# Patient Record
Sex: FEMALE | Race: WHITE | Employment: OTHER | URBAN - METROPOLITAN AREA
[De-identification: names, ages, dates, MRNs, and addresses within clinical notes are randomized per-mention and may not be internally consistent; named-entity substitution may affect disease eponyms.]

---

## 2019-07-11 ENCOUNTER — HOSPITAL ENCOUNTER (OUTPATIENT)
Age: 74
Discharge: HOME | End: 2019-07-11
Payer: MEDICARE

## 2019-07-11 LAB
BACTERIA: ABNORMAL /HPF
BILIRUBIN URINE: NEGATIVE
BLOOD, URINE: ABNORMAL
CLARITY: CLEAR
COLOR: YELLOW
GLUCOSE URINE: NEGATIVE MG/DL
KETONES, URINE: ABNORMAL MG/DL
LEUKOCYTE ESTERASE, URINE: ABNORMAL
NITRITE, URINE: NEGATIVE
PH UA: 6 (ref 5–9)
PROTEIN UA: 100 MG/DL
RBC UA: ABNORMAL /HPF (ref 0–2)
SPECIFIC GRAVITY UA: 1.02 (ref 1–1.03)
UROBILINOGEN, URINE: 0.2 E.U./DL
WBC UA: ABNORMAL /HPF (ref 0–5)

## 2019-07-11 PROCEDURE — 87186 SC STD MICRODIL/AGAR DIL: CPT

## 2019-07-11 PROCEDURE — 87088 URINE BACTERIA CULTURE: CPT

## 2019-07-11 PROCEDURE — 87077 CULTURE AEROBIC IDENTIFY: CPT

## 2019-07-11 PROCEDURE — 81001 URINALYSIS AUTO W/SCOPE: CPT

## 2019-07-14 LAB
ORGANISM: ABNORMAL
URINE CULTURE, ROUTINE: ABNORMAL
URINE CULTURE, ROUTINE: ABNORMAL

## 2019-11-19 ENCOUNTER — HOSPITAL ENCOUNTER (OUTPATIENT)
Age: 74
Discharge: HOME | End: 2019-11-21

## 2019-11-19 PROCEDURE — 88112 CYTOPATH CELL ENHANCE TECH: CPT

## 2020-01-01 ENCOUNTER — HOSPITAL ENCOUNTER (OUTPATIENT)
Age: 75
Discharge: HOME | End: 2020-12-09
Payer: MEDICARE

## 2020-01-01 ENCOUNTER — ANESTHESIA EVENT (OUTPATIENT)
Dept: OPERATING ROOM | Age: 75
End: 2020-01-01
Payer: MEDICARE

## 2020-01-01 ENCOUNTER — HOSPITAL ENCOUNTER (OUTPATIENT)
Age: 75
Discharge: HOME | End: 2020-10-31
Payer: MEDICARE

## 2020-01-01 ENCOUNTER — HOSPITAL ENCOUNTER (OUTPATIENT)
Dept: ULTRASOUND IMAGING | Age: 75
Discharge: HOME | End: 2020-12-09
Payer: MEDICARE

## 2020-01-01 ENCOUNTER — HOSPITAL ENCOUNTER (EMERGENCY)
Age: 75
Discharge: HOME | End: 2020-09-08
Attending: EMERGENCY MEDICINE
Payer: MEDICARE

## 2020-01-01 ENCOUNTER — HOSPITAL ENCOUNTER (OUTPATIENT)
Age: 75
Setting detail: OUTPATIENT SURGERY
Discharge: HOME | End: 2020-11-04
Attending: INTERNAL MEDICINE | Admitting: INTERNAL MEDICINE
Payer: MEDICARE

## 2020-01-01 ENCOUNTER — APPOINTMENT (OUTPATIENT)
Dept: GENERAL RADIOLOGY | Age: 75
End: 2020-01-01
Attending: INTERNAL MEDICINE
Payer: MEDICARE

## 2020-01-01 ENCOUNTER — ANESTHESIA (OUTPATIENT)
Dept: OPERATING ROOM | Age: 75
End: 2020-01-01
Payer: MEDICARE

## 2020-01-01 ENCOUNTER — HOSPITAL ENCOUNTER (OUTPATIENT)
Age: 75
Discharge: HOME | End: 2020-11-02
Payer: MEDICARE

## 2020-01-01 VITALS
OXYGEN SATURATION: 93 % | RESPIRATION RATE: 24 BRPM | SYSTOLIC BLOOD PRESSURE: 113 MMHG | DIASTOLIC BLOOD PRESSURE: 70 MMHG

## 2020-01-01 VITALS
OXYGEN SATURATION: 94 % | BODY MASS INDEX: 25.1 KG/M2 | HEIGHT: 64 IN | RESPIRATION RATE: 16 BRPM | SYSTOLIC BLOOD PRESSURE: 111 MMHG | HEART RATE: 67 BPM | TEMPERATURE: 97.4 F | WEIGHT: 147 LBS | DIASTOLIC BLOOD PRESSURE: 67 MMHG

## 2020-01-01 VITALS
HEIGHT: 64 IN | TEMPERATURE: 97.3 F | DIASTOLIC BLOOD PRESSURE: 68 MMHG | WEIGHT: 155 LBS | OXYGEN SATURATION: 98 % | RESPIRATION RATE: 16 BRPM | SYSTOLIC BLOOD PRESSURE: 120 MMHG | BODY MASS INDEX: 26.46 KG/M2 | HEART RATE: 90 BPM

## 2020-01-01 LAB
AFP-TUMOR MARKER: 12 NG/ML (ref 0–9)
ALBUMIN SERPL-MCNC: 3.4 G/DL (ref 3.5–5.2)
ALBUMIN SERPL-MCNC: 3.7 G/DL (ref 3.5–5.2)
ALP BLD-CCNC: 106 U/L (ref 35–104)
ALP BLD-CCNC: 117 U/L (ref 35–104)
ALT SERPL-CCNC: 10 U/L (ref 0–32)
ALT SERPL-CCNC: 11 U/L (ref 0–32)
ANION GAP SERPL CALCULATED.3IONS-SCNC: 10 MMOL/L (ref 7–16)
ANION GAP SERPL CALCULATED.3IONS-SCNC: 9 MMOL/L (ref 7–16)
APTT: 33.3 SEC (ref 24.5–35.1)
AST SERPL-CCNC: 23 U/L (ref 0–31)
AST SERPL-CCNC: 23 U/L (ref 0–31)
BASOPHILS ABSOLUTE: 0.05 E9/L (ref 0–0.2)
BASOPHILS RELATIVE PERCENT: 1 % (ref 0–2)
BILIRUB SERPL-MCNC: 0.7 MG/DL (ref 0–1.2)
BILIRUB SERPL-MCNC: 1 MG/DL (ref 0–1.2)
BUN BLDV-MCNC: 19 MG/DL (ref 8–23)
BUN BLDV-MCNC: 23 MG/DL (ref 8–23)
CALCIUM SERPL-MCNC: 10 MG/DL (ref 8.6–10.2)
CALCIUM SERPL-MCNC: 9.1 MG/DL (ref 8.6–10.2)
CHLORIDE BLD-SCNC: 104 MMOL/L (ref 98–107)
CHLORIDE BLD-SCNC: 104 MMOL/L (ref 98–107)
CO2: 26 MMOL/L (ref 22–29)
CO2: 28 MMOL/L (ref 22–29)
CREAT SERPL-MCNC: 1.1 MG/DL (ref 0.5–1)
CREAT SERPL-MCNC: 1.3 MG/DL (ref 0.5–1)
EKG ATRIAL RATE: 70 BPM
EKG P AXIS: 28 DEGREES
EKG P-R INTERVAL: 150 MS
EKG Q-T INTERVAL: 436 MS
EKG QRS DURATION: 78 MS
EKG QTC CALCULATION (BAZETT): 470 MS
EKG R AXIS: -35 DEGREES
EKG T AXIS: 19 DEGREES
EKG VENTRICULAR RATE: 70 BPM
EOSINOPHILS ABSOLUTE: 0.21 E9/L (ref 0.05–0.5)
EOSINOPHILS RELATIVE PERCENT: 4.4 % (ref 0–6)
GFR AFRICAN AMERICAN: 48
GFR AFRICAN AMERICAN: 59
GFR NON-AFRICAN AMERICAN: 40 ML/MIN/1.73
GFR NON-AFRICAN AMERICAN: 48 ML/MIN/1.73
GLUCOSE BLD-MCNC: 114 MG/DL (ref 74–99)
GLUCOSE BLD-MCNC: 142 MG/DL (ref 74–99)
HCT VFR BLD CALC: 30.6 % (ref 34–48)
HCT VFR BLD CALC: 32.1 % (ref 34–48)
HEMOGLOBIN: 10.2 G/DL (ref 11.5–15.5)
HEMOGLOBIN: 9.8 G/DL (ref 11.5–15.5)
IMMATURE GRANULOCYTES #: 0.02 E9/L
IMMATURE GRANULOCYTES %: 0.4 % (ref 0–5)
INR BLD: 1.2
LYMPHOCYTES ABSOLUTE: 0.72 E9/L (ref 1.5–4)
LYMPHOCYTES RELATIVE PERCENT: 15 % (ref 20–42)
MCH RBC QN AUTO: 31.6 PG (ref 26–35)
MCH RBC QN AUTO: 31.8 PG (ref 26–35)
MCHC RBC AUTO-ENTMCNC: 31.8 % (ref 32–34.5)
MCHC RBC AUTO-ENTMCNC: 32 % (ref 32–34.5)
MCV RBC AUTO: 100 FL (ref 80–99.9)
MCV RBC AUTO: 98.7 FL (ref 80–99.9)
MONOCYTES ABSOLUTE: 0.49 E9/L (ref 0.1–0.95)
MONOCYTES RELATIVE PERCENT: 10.2 % (ref 2–12)
NEUTROPHILS ABSOLUTE: 3.31 E9/L (ref 1.8–7.3)
NEUTROPHILS RELATIVE PERCENT: 69 % (ref 43–80)
PDW BLD-RTO: 14.5 FL (ref 11.5–15)
PDW BLD-RTO: 15.6 FL (ref 11.5–15)
PLATELET # BLD: 150 E9/L (ref 130–450)
PLATELET # BLD: 169 E9/L (ref 130–450)
PMV BLD AUTO: 9.8 FL (ref 7–12)
PMV BLD AUTO: 9.9 FL (ref 7–12)
POTASSIUM REFLEX MAGNESIUM: 3.7 MMOL/L (ref 3.5–5)
POTASSIUM SERPL-SCNC: 4 MMOL/L (ref 3.5–5)
PROTHROMBIN TIME: 14 SEC (ref 9.3–12.4)
RBC # BLD: 3.1 E12/L (ref 3.5–5.5)
RBC # BLD: 3.21 E12/L (ref 3.5–5.5)
SARS-COV-2: NOT DETECTED
SODIUM BLD-SCNC: 140 MMOL/L (ref 132–146)
SODIUM BLD-SCNC: 141 MMOL/L (ref 132–146)
SOURCE: NORMAL
TOTAL PROTEIN: 7.5 G/DL (ref 6.4–8.3)
TOTAL PROTEIN: 8.3 G/DL (ref 6.4–8.3)
WBC # BLD: 3.8 E9/L (ref 4.5–11.5)
WBC # BLD: 4.8 E9/L (ref 4.5–11.5)

## 2020-01-01 PROCEDURE — 2500000003 HC RX 250 WO HCPCS: Performed by: NURSE ANESTHETIST, CERTIFIED REGISTERED

## 2020-01-01 PROCEDURE — 82105 ALPHA-FETOPROTEIN SERUM: CPT

## 2020-01-01 PROCEDURE — 2709999900 HC NON-CHARGEABLE SUPPLY: Performed by: INTERNAL MEDICINE

## 2020-01-01 PROCEDURE — 7100000001 HC PACU RECOVERY - ADDTL 15 MIN: Performed by: INTERNAL MEDICINE

## 2020-01-01 PROCEDURE — 36415 COLL VENOUS BLD VENIPUNCTURE: CPT

## 2020-01-01 PROCEDURE — 7100000010 HC PHASE II RECOVERY - FIRST 15 MIN: Performed by: INTERNAL MEDICINE

## 2020-01-01 PROCEDURE — 3700000001 HC ADD 15 MINUTES (ANESTHESIA): Performed by: INTERNAL MEDICINE

## 2020-01-01 PROCEDURE — P9047 ALBUMIN (HUMAN), 25%, 50ML: HCPCS | Performed by: INTERNAL MEDICINE

## 2020-01-01 PROCEDURE — 93005 ELECTROCARDIOGRAM TRACING: CPT | Performed by: ANESTHESIOLOGY

## 2020-01-01 PROCEDURE — 6360000002 HC RX W HCPCS: Performed by: NURSE ANESTHETIST, CERTIFIED REGISTERED

## 2020-01-01 PROCEDURE — 85027 COMPLETE CBC AUTOMATED: CPT

## 2020-01-01 PROCEDURE — 6360000002 HC RX W HCPCS: Performed by: INTERNAL MEDICINE

## 2020-01-01 PROCEDURE — 85610 PROTHROMBIN TIME: CPT

## 2020-01-01 PROCEDURE — 49083 ABD PARACENTESIS W/IMAGING: CPT

## 2020-01-01 PROCEDURE — 85730 THROMBOPLASTIN TIME PARTIAL: CPT

## 2020-01-01 PROCEDURE — U0003 INFECTIOUS AGENT DETECTION BY NUCLEIC ACID (DNA OR RNA); SEVERE ACUTE RESPIRATORY SYNDROME CORONAVIRUS 2 (SARS-COV-2) (CORONAVIRUS DISEASE [COVID-19]), AMPLIFIED PROBE TECHNIQUE, MAKING USE OF HIGH THROUGHPUT TECHNOLOGIES AS DESCRIBED BY CMS-2020-01-R: HCPCS

## 2020-01-01 PROCEDURE — 99282 EMERGENCY DEPT VISIT SF MDM: CPT

## 2020-01-01 PROCEDURE — 3600007503: Performed by: INTERNAL MEDICINE

## 2020-01-01 PROCEDURE — 80053 COMPREHEN METABOLIC PANEL: CPT

## 2020-01-01 PROCEDURE — 85025 COMPLETE CBC W/AUTO DIFF WBC: CPT

## 2020-01-01 PROCEDURE — 7100000000 HC PACU RECOVERY - FIRST 15 MIN: Performed by: INTERNAL MEDICINE

## 2020-01-01 PROCEDURE — 2720000010 HC SURG SUPPLY STERILE: Performed by: INTERNAL MEDICINE

## 2020-01-01 PROCEDURE — 3700000000 HC ANESTHESIA ATTENDED CARE: Performed by: INTERNAL MEDICINE

## 2020-01-01 PROCEDURE — 2580000003 HC RX 258: Performed by: ANESTHESIOLOGY

## 2020-01-01 PROCEDURE — 7100000011 HC PHASE II RECOVERY - ADDTL 15 MIN: Performed by: INTERNAL MEDICINE

## 2020-01-01 PROCEDURE — 3600007513: Performed by: INTERNAL MEDICINE

## 2020-01-01 PROCEDURE — 3209999900 FLUORO FOR SURGICAL PROCEDURES

## 2020-01-01 RX ORDER — ALBUMIN (HUMAN) 12.5 G/50ML
25 SOLUTION INTRAVENOUS ONCE
Status: COMPLETED | OUTPATIENT
Start: 2020-01-01 | End: 2020-01-01

## 2020-01-01 RX ORDER — SODIUM CHLORIDE 0.9 % (FLUSH) 0.9 %
10 SYRINGE (ML) INJECTION PRN
Status: DISCONTINUED | OUTPATIENT
Start: 2020-01-01 | End: 2020-01-01 | Stop reason: SDUPTHER

## 2020-01-01 RX ORDER — SODIUM CHLORIDE 9 MG/ML
INJECTION, SOLUTION INTRAVENOUS CONTINUOUS
Status: DISCONTINUED | OUTPATIENT
Start: 2020-01-01 | End: 2020-01-01 | Stop reason: HOSPADM

## 2020-01-01 RX ORDER — ONDANSETRON 2 MG/ML
INJECTION INTRAMUSCULAR; INTRAVENOUS PRN
Status: DISCONTINUED | OUTPATIENT
Start: 2020-01-01 | End: 2020-01-01 | Stop reason: SDUPTHER

## 2020-01-01 RX ORDER — MULTIVITAMIN WITH IRON
250 TABLET ORAL DAILY
COMMUNITY

## 2020-01-01 RX ORDER — FENTANYL CITRATE 50 UG/ML
INJECTION, SOLUTION INTRAMUSCULAR; INTRAVENOUS PRN
Status: DISCONTINUED | OUTPATIENT
Start: 2020-01-01 | End: 2020-01-01 | Stop reason: SDUPTHER

## 2020-01-01 RX ORDER — SODIUM CHLORIDE 0.9 % (FLUSH) 0.9 %
10 SYRINGE (ML) INJECTION EVERY 12 HOURS SCHEDULED
Status: DISCONTINUED | OUTPATIENT
Start: 2020-01-01 | End: 2020-01-01 | Stop reason: SDUPTHER

## 2020-01-01 RX ORDER — SODIUM CHLORIDE 0.9 % (FLUSH) 0.9 %
10 SYRINGE (ML) INJECTION PRN
Status: DISCONTINUED | OUTPATIENT
Start: 2020-01-01 | End: 2020-01-01 | Stop reason: HOSPADM

## 2020-01-01 RX ORDER — MEPERIDINE HYDROCHLORIDE 25 MG/ML
12.5 INJECTION INTRAMUSCULAR; INTRAVENOUS; SUBCUTANEOUS EVERY 5 MIN PRN
Status: DISCONTINUED | OUTPATIENT
Start: 2020-01-01 | End: 2020-01-01 | Stop reason: HOSPADM

## 2020-01-01 RX ORDER — GLYCOPYRROLATE 1 MG/5 ML
SYRINGE (ML) INTRAVENOUS PRN
Status: DISCONTINUED | OUTPATIENT
Start: 2020-01-01 | End: 2020-01-01 | Stop reason: SDUPTHER

## 2020-01-01 RX ORDER — EPHEDRINE SULFATE/0.9% NACL/PF 50 MG/5 ML
SYRINGE (ML) INTRAVENOUS PRN
Status: DISCONTINUED | OUTPATIENT
Start: 2020-01-01 | End: 2020-01-01 | Stop reason: SDUPTHER

## 2020-01-01 RX ORDER — PANTOPRAZOLE SODIUM 40 MG/1
40 TABLET, DELAYED RELEASE ORAL DAILY
COMMUNITY

## 2020-01-01 RX ORDER — ROCURONIUM BROMIDE 10 MG/ML
INJECTION, SOLUTION INTRAVENOUS PRN
Status: DISCONTINUED | OUTPATIENT
Start: 2020-01-01 | End: 2020-01-01 | Stop reason: SDUPTHER

## 2020-01-01 RX ORDER — ESCITALOPRAM OXALATE 10 MG/1
10 TABLET ORAL DAILY
COMMUNITY

## 2020-01-01 RX ORDER — NEOSTIGMINE METHYLSULFATE 1 MG/ML
INJECTION, SOLUTION INTRAVENOUS PRN
Status: DISCONTINUED | OUTPATIENT
Start: 2020-01-01 | End: 2020-01-01 | Stop reason: SDUPTHER

## 2020-01-01 RX ORDER — MIDAZOLAM HYDROCHLORIDE 1 MG/ML
INJECTION INTRAMUSCULAR; INTRAVENOUS PRN
Status: DISCONTINUED | OUTPATIENT
Start: 2020-01-01 | End: 2020-01-01 | Stop reason: SDUPTHER

## 2020-01-01 RX ORDER — SODIUM CHLORIDE 0.9 % (FLUSH) 0.9 %
10 SYRINGE (ML) INJECTION EVERY 12 HOURS SCHEDULED
Status: DISCONTINUED | OUTPATIENT
Start: 2020-01-01 | End: 2020-01-01 | Stop reason: HOSPADM

## 2020-01-01 RX ORDER — DEXAMETHASONE SODIUM PHOSPHATE 4 MG/ML
INJECTION, SOLUTION INTRA-ARTICULAR; INTRALESIONAL; INTRAMUSCULAR; INTRAVENOUS; SOFT TISSUE PRN
Status: DISCONTINUED | OUTPATIENT
Start: 2020-01-01 | End: 2020-01-01 | Stop reason: SDUPTHER

## 2020-01-01 RX ORDER — PROPOFOL 10 MG/ML
INJECTION, EMULSION INTRAVENOUS PRN
Status: DISCONTINUED | OUTPATIENT
Start: 2020-01-01 | End: 2020-01-01 | Stop reason: SDUPTHER

## 2020-01-01 RX ORDER — LIDOCAINE HYDROCHLORIDE 20 MG/ML
INJECTION, SOLUTION INTRAVENOUS PRN
Status: DISCONTINUED | OUTPATIENT
Start: 2020-01-01 | End: 2020-01-01 | Stop reason: SDUPTHER

## 2020-01-01 RX ADMIN — LIDOCAINE HYDROCHLORIDE 100 MG: 20 INJECTION, SOLUTION INTRAVENOUS at 08:34

## 2020-01-01 RX ADMIN — ONDANSETRON 4 MG: 2 INJECTION INTRAMUSCULAR; INTRAVENOUS at 09:31

## 2020-01-01 RX ADMIN — GLUCAGON HYDROCHLORIDE 0.5 MG: KIT at 09:16

## 2020-01-01 RX ADMIN — Medication 10 MG: at 08:44

## 2020-01-01 RX ADMIN — SODIUM CHLORIDE: 9 INJECTION, SOLUTION INTRAVENOUS at 08:29

## 2020-01-01 RX ADMIN — DEXAMETHASONE SODIUM PHOSPHATE 4 MG: 4 INJECTION, SOLUTION INTRAMUSCULAR; INTRAVENOUS at 08:34

## 2020-01-01 RX ADMIN — SODIUM CHLORIDE: 9 INJECTION, SOLUTION INTRAVENOUS at 09:45

## 2020-01-01 RX ADMIN — MIDAZOLAM 2 MG: 1 INJECTION INTRAMUSCULAR; INTRAVENOUS at 08:29

## 2020-01-01 RX ADMIN — Medication 3 MG: at 09:25

## 2020-01-01 RX ADMIN — Medication 10 MG: at 08:46

## 2020-01-01 RX ADMIN — ROCURONIUM BROMIDE 30 MG: 10 SOLUTION INTRAVENOUS at 08:34

## 2020-01-01 RX ADMIN — DEXAMETHASONE SODIUM PHOSPHATE 4 MG: 4 INJECTION, SOLUTION INTRAMUSCULAR; INTRAVENOUS at 09:31

## 2020-01-01 RX ADMIN — PROPOFOL 150 MG: 10 INJECTION, EMULSION INTRAVENOUS at 08:34

## 2020-01-01 RX ADMIN — ALBUMIN (HUMAN) 25 G: 0.25 INJECTION, SOLUTION INTRAVENOUS at 15:06

## 2020-01-01 RX ADMIN — Medication 0.6 MG: at 09:25

## 2020-01-01 RX ADMIN — FENTANYL CITRATE 50 MCG: 50 INJECTION, SOLUTION INTRAMUSCULAR; INTRAVENOUS at 08:34

## 2020-01-01 ASSESSMENT — PULMONARY FUNCTION TESTS
PIF_VALUE: 19
PIF_VALUE: 17
PIF_VALUE: 16
PIF_VALUE: 30
PIF_VALUE: 2
PIF_VALUE: 0
PIF_VALUE: 19
PIF_VALUE: 3
PIF_VALUE: 22
PIF_VALUE: 17
PIF_VALUE: 16
PIF_VALUE: 22
PIF_VALUE: 4
PIF_VALUE: 18
PIF_VALUE: 19
PIF_VALUE: 17
PIF_VALUE: 16
PIF_VALUE: 20
PIF_VALUE: 38
PIF_VALUE: 16
PIF_VALUE: 17
PIF_VALUE: 19
PIF_VALUE: 15
PIF_VALUE: 16
PIF_VALUE: 19
PIF_VALUE: 15
PIF_VALUE: 17
PIF_VALUE: 18
PIF_VALUE: 19
PIF_VALUE: 19
PIF_VALUE: 3
PIF_VALUE: 19
PIF_VALUE: 17
PIF_VALUE: 29
PIF_VALUE: 19
PIF_VALUE: 16
PIF_VALUE: 3
PIF_VALUE: 19
PIF_VALUE: 17
PIF_VALUE: 16
PIF_VALUE: 19
PIF_VALUE: 16
PIF_VALUE: 17
PIF_VALUE: 19
PIF_VALUE: 19
PIF_VALUE: 2
PIF_VALUE: 15
PIF_VALUE: 20
PIF_VALUE: 20
PIF_VALUE: 19
PIF_VALUE: 20
PIF_VALUE: 16
PIF_VALUE: 15
PIF_VALUE: 17
PIF_VALUE: 0
PIF_VALUE: 19
PIF_VALUE: 16
PIF_VALUE: 19
PIF_VALUE: 17
PIF_VALUE: 20
PIF_VALUE: 16
PIF_VALUE: 2
PIF_VALUE: 16
PIF_VALUE: 20
PIF_VALUE: 17
PIF_VALUE: 15
PIF_VALUE: 0
PIF_VALUE: 19
PIF_VALUE: 16
PIF_VALUE: 1
PIF_VALUE: 19
PIF_VALUE: 16
PIF_VALUE: 17
PIF_VALUE: 16
PIF_VALUE: 17

## 2020-01-01 ASSESSMENT — PAIN - FUNCTIONAL ASSESSMENT: PAIN_FUNCTIONAL_ASSESSMENT: 0-10

## 2020-01-01 ASSESSMENT — PAIN SCALES - GENERAL
PAINLEVEL_OUTOF10: 0
PAINLEVEL_OUTOF10: 0

## 2020-01-14 ENCOUNTER — HOSPITAL ENCOUNTER (EMERGENCY)
Age: 75
Discharge: HOME | End: 2020-01-14
Attending: EMERGENCY MEDICINE
Payer: MEDICARE

## 2020-01-14 ENCOUNTER — APPOINTMENT (OUTPATIENT)
Dept: GENERAL RADIOLOGY | Age: 75
End: 2020-01-14
Payer: MEDICARE

## 2020-01-14 ENCOUNTER — APPOINTMENT (OUTPATIENT)
Dept: CT IMAGING | Age: 75
End: 2020-01-14
Payer: MEDICARE

## 2020-01-14 VITALS
HEIGHT: 64 IN | BODY MASS INDEX: 26.12 KG/M2 | SYSTOLIC BLOOD PRESSURE: 118 MMHG | HEART RATE: 60 BPM | WEIGHT: 153 LBS | TEMPERATURE: 98.2 F | DIASTOLIC BLOOD PRESSURE: 72 MMHG | RESPIRATION RATE: 18 BRPM | OXYGEN SATURATION: 92 %

## 2020-01-14 LAB
ALBUMIN SERPL-MCNC: 3.9 G/DL (ref 3.5–5.2)
ALP BLD-CCNC: 63 U/L (ref 35–104)
ALT SERPL-CCNC: 13 U/L (ref 0–32)
ANION GAP SERPL CALCULATED.3IONS-SCNC: 12 MMOL/L (ref 7–16)
AST SERPL-CCNC: 29 U/L (ref 0–31)
BACTERIA: ABNORMAL /HPF
BASOPHILS ABSOLUTE: 0.02 E9/L (ref 0–0.2)
BASOPHILS RELATIVE PERCENT: 0.6 % (ref 0–2)
BILIRUB SERPL-MCNC: 0.6 MG/DL (ref 0–1.2)
BILIRUBIN URINE: NEGATIVE
BLOOD, URINE: ABNORMAL
BUN BLDV-MCNC: 17 MG/DL (ref 8–23)
CALCIUM SERPL-MCNC: 9.6 MG/DL (ref 8.6–10.2)
CHLORIDE BLD-SCNC: 104 MMOL/L (ref 98–107)
CHP ED QC CHECK: YES
CLARITY: ABNORMAL
CO2: 24 MMOL/L (ref 22–29)
COLOR: YELLOW
CREAT SERPL-MCNC: 1 MG/DL (ref 0.5–1)
EKG ATRIAL RATE: 48 BPM
EKG P AXIS: 53 DEGREES
EKG P-R INTERVAL: 166 MS
EKG Q-T INTERVAL: 508 MS
EKG QRS DURATION: 80 MS
EKG QTC CALCULATION (BAZETT): 453 MS
EKG R AXIS: -35 DEGREES
EKG T AXIS: 62 DEGREES
EKG VENTRICULAR RATE: 48 BPM
EOSINOPHILS ABSOLUTE: 0.12 E9/L (ref 0.05–0.5)
EOSINOPHILS RELATIVE PERCENT: 3.8 % (ref 0–6)
GFR AFRICAN AMERICAN: >60
GFR NON-AFRICAN AMERICAN: 54 ML/MIN/1.73
GLUCOSE BLD-MCNC: 110 MG/DL
GLUCOSE BLD-MCNC: 99 MG/DL (ref 74–99)
GLUCOSE URINE: NEGATIVE MG/DL
HCT VFR BLD CALC: 32 % (ref 34–48)
HEMOGLOBIN: 9.9 G/DL (ref 11.5–15.5)
IMMATURE GRANULOCYTES #: 0 E9/L
IMMATURE GRANULOCYTES %: 0 % (ref 0–5)
KETONES, URINE: NEGATIVE MG/DL
LEUKOCYTE ESTERASE, URINE: ABNORMAL
LYMPHOCYTES ABSOLUTE: 0.55 E9/L (ref 1.5–4)
LYMPHOCYTES RELATIVE PERCENT: 17.4 % (ref 20–42)
MCH RBC QN AUTO: 31 PG (ref 26–35)
MCHC RBC AUTO-ENTMCNC: 30.9 % (ref 32–34.5)
MCV RBC AUTO: 100.3 FL (ref 80–99.9)
METER GLUCOSE: 110 MG/DL (ref 74–99)
MONOCYTES ABSOLUTE: 0.36 E9/L (ref 0.1–0.95)
MONOCYTES RELATIVE PERCENT: 11.4 % (ref 2–12)
NEUTROPHILS ABSOLUTE: 2.11 E9/L (ref 1.8–7.3)
NEUTROPHILS RELATIVE PERCENT: 66.8 % (ref 43–80)
NITRITE, URINE: NEGATIVE
PDW BLD-RTO: 12.8 FL (ref 11.5–15)
PH UA: 6.5 (ref 5–9)
PLATELET # BLD: 97 E9/L (ref 130–450)
PLATELET CONFIRMATION: NORMAL
PMV BLD AUTO: 10.4 FL (ref 7–12)
POTASSIUM REFLEX MAGNESIUM: 4.6 MMOL/L (ref 3.5–5)
PROTEIN UA: ABNORMAL MG/DL
RBC # BLD: 3.19 E12/L (ref 3.5–5.5)
RBC # BLD: NORMAL 10*6/UL
RBC UA: ABNORMAL /HPF (ref 0–2)
SODIUM BLD-SCNC: 140 MMOL/L (ref 132–146)
SPECIFIC GRAVITY UA: 1.01 (ref 1–1.03)
TOTAL PROTEIN: 7.7 G/DL (ref 6.4–8.3)
TROPONIN: <0.01 NG/ML (ref 0–0.03)
UROBILINOGEN, URINE: 0.2 E.U./DL
WBC # BLD: 3.2 E9/L (ref 4.5–11.5)
WBC UA: >20 /HPF (ref 0–5)

## 2020-01-14 PROCEDURE — 87186 SC STD MICRODIL/AGAR DIL: CPT

## 2020-01-14 PROCEDURE — 85025 COMPLETE CBC W/AUTO DIFF WBC: CPT

## 2020-01-14 PROCEDURE — 82962 GLUCOSE BLOOD TEST: CPT

## 2020-01-14 PROCEDURE — 99284 EMERGENCY DEPT VISIT MOD MDM: CPT

## 2020-01-14 PROCEDURE — 84484 ASSAY OF TROPONIN QUANT: CPT

## 2020-01-14 PROCEDURE — 70450 CT HEAD/BRAIN W/O DYE: CPT

## 2020-01-14 PROCEDURE — 87147 CULTURE TYPE IMMUNOLOGIC: CPT

## 2020-01-14 PROCEDURE — 93005 ELECTROCARDIOGRAM TRACING: CPT | Performed by: EMERGENCY MEDICINE

## 2020-01-14 PROCEDURE — 93010 ELECTROCARDIOGRAM REPORT: CPT | Performed by: INTERNAL MEDICINE

## 2020-01-14 PROCEDURE — 72125 CT NECK SPINE W/O DYE: CPT

## 2020-01-14 PROCEDURE — 36415 COLL VENOUS BLD VENIPUNCTURE: CPT

## 2020-01-14 PROCEDURE — 80053 COMPREHEN METABOLIC PANEL: CPT

## 2020-01-14 PROCEDURE — 81001 URINALYSIS AUTO W/SCOPE: CPT

## 2020-01-14 PROCEDURE — 93005 ELECTROCARDIOGRAM TRACING: CPT | Performed by: NURSE PRACTITIONER

## 2020-01-14 PROCEDURE — 87088 URINE BACTERIA CULTURE: CPT

## 2020-01-14 PROCEDURE — 71045 X-RAY EXAM CHEST 1 VIEW: CPT

## 2020-01-14 RX ORDER — CEPHALEXIN 500 MG/1
500 CAPSULE ORAL 4 TIMES DAILY
Qty: 28 CAPSULE | Refills: 0 | Status: SHIPPED | OUTPATIENT
Start: 2020-01-14 | End: 2020-01-01 | Stop reason: ALTCHOICE

## 2020-01-14 NOTE — ED PROVIDER NOTES
HPI:  1/14/20, Time: 12:29 PM         William Resendiz is a 69 y.o. female presenting to the ED for syncopal episode, beginning just prior to arrival.  The complaint has been intermittent, mild in severity, and worsened by nothing. Patient states that she was at her appointment this morning when she began feeling lightheaded and had a syncopal episode. Patient denies having any associated chest pain, shortness of breath, abdominal pain, nausea, vomiting, diaphoresis. She denies any vision changes, speech changes, numbness, tingling, weakness. Patient states that she feels fine at this moment. Patient has a history of orthostatic hypotension. ROS:   Pertinent positives and negatives are stated within HPI, all other systems reviewed and are negative.  --------------------------------------------- PAST HISTORY ---------------------------------------------  Past Medical History:  has a past medical history of Heart murmur, Hypothyroidism, and Liver cirrhosis ([facility] [address] Past Surgical History:  has a past surgical history that includes Appendectomy; Cholecystectomy; Hysterectomy; and Ovary removal.    Social History:  reports that she has never smoked. She has never used smokeless tobacco. She reports that she does not drink alcohol or use drugs. Family History: family history includes Cancer in her brother. The patients home medications have been reviewed. Allergies: Other and Peanuts [peanut oil]    ---------------------------------------------------PHYSICAL EXAM--------------------------------------    Constitutional/General: Alert and oriented x3, well appearing, non toxic in NAD  Head: Normocephalic and atraumatic  Eyes: PERRL, EOMI  Mouth: Oropharynx clear, handling secretions, no trismus  Neck: Supple, full ROM, non tender to palpation in the midline, no stridor, no crepitus, no meningeal signs  Pulmonary: Lungs clear to auscultation bilaterally, no wheezes, rales, or rhonchi.  Not in respiratory WO Contrast   Final Result      No acute intracranial abnormality. CT Cervical Spine WO Contrast   Final Result      1. No fracture or joint dislocation. 2. Mild degenerative changes. 3. 1.2 x 1.1 cm left parotid nodule. Follow-up is recommended. XR CHEST PORTABLE   Final Result   No acute cardiopulmonary abnormality. EKG Interpretation  Interpreted by emergency department physician    Rhythm: sinus bradycardia  Rate: bradycardia  Axis: axis  Conduction: normal  ST Segments: nonspecific changes  T Waves: normal    Clinical Impression: non-specific EKG  Comparison to prior EKG: None      ------------------------- NURSING NOTES AND VITALS REVIEWED ---------------------------   The nursing notes within the ED encounter and vital signs as below have been reviewed by myself. /72   Pulse 60   Temp 98.2 °F (36.8 °C) (Oral)   Resp 18   Ht 5' 4\" (1.626 m)   Wt 153 lb (69.4 kg)   SpO2 92%   BMI 26.26 kg/m²   Oxygen Saturation Interpretation: Normal    The patients available past medical records and past encounters were reviewed. ------------------------------ ED COURSE/MEDICAL DECISION MAKING----------------------  Medications - No data to display          Medical Decision Making:    Patient is asymptomatic in the ED. Labs and imaging obtained. Urine shows signs of infection. I will start patient on keflex. Troponin was WNL. Pancytopenia present. CT of the cervical spine showed a Left partoid nodule that needs follow up. Orthostatic vitals were positive. I discussed the results with the patient. I told her to follow up with her primary care physician this week, to take medication as directed, and to return for worsening symptoms. Re-Evaluations:             Re-evaluation.   Patients symptoms show no change      Consultations:             none            This patient's ED course included: a personal history and physicial examination, re-evaluation prior to disposition,

## 2020-01-14 NOTE — ED NOTES
FIRST PROVIDER CONTACT ASSESSMENT NOTE      Department of Emergency Medicine   ED  First Provider Note   1/14/20  11:08 AM    Chief Complaint: No chief complaint on file. History of Present Illness:    Debbie Ramsay is a 69 y.o. female who presents to the ED for complaint syncopal episode while she was at ortho trauma with her . Reports hx of syncope. Denies headache prior to the syncope. But reports headache after incident. She reports that she does take Metoprolol and took her dose this AM.  Denies chest pain, denies shortness of breath. Focused Screening Exam:  Constitutional:  Alert, appears stated age and is in no distress.       *ALLERGIES*     Other and Peanuts [peanut oil]     ED Triage Vitals [01/14/20 1105]   BP Temp Temp src Pulse Resp SpO2 Height Weight   -- -- -- (!) 44 -- 92 % -- --        Initial Plan of Care:  Initiate Treatment-Testing, Proceed toTreatment Area When Bed Available for ED Attending/MLP to Continue Care    -----------------END OF FIRST PROVIDER CONTACT ASSESSMENT NOTE--------------  Electronically signed by ESSENCE Stephens CNP   DD: 1/14/20     ESSENCE Stephens CNP  01/14/20 [id number]

## 2020-01-15 LAB
EKG ATRIAL RATE: 50 BPM
EKG P AXIS: 52 DEGREES
EKG P-R INTERVAL: 172 MS
EKG Q-T INTERVAL: 514 MS
EKG QRS DURATION: 82 MS
EKG QTC CALCULATION (BAZETT): 468 MS
EKG R AXIS: -28 DEGREES
EKG T AXIS: 60 DEGREES
EKG VENTRICULAR RATE: 50 BPM

## 2020-01-15 PROCEDURE — 93010 ELECTROCARDIOGRAM REPORT: CPT | Performed by: INTERNAL MEDICINE

## 2020-01-16 LAB — URINE CULTURE, ROUTINE: NORMAL

## 2020-09-08 NOTE — ED PROVIDER NOTES
HPI:  9/8/20,   Time: 6:47 PM EDT         Eva Tyson is a 77 y.o. female presenting to the ED for paracentesis drainage, beginning 2 days ago. The complaint has been persistent, mild in severity, and worsened by nothing. Patient has a history of liver cirrhosis and ascites. Patient had a paracentesis done 2 days ago. She states that she still having some leakage from the needle insertion site. Patient denies any fever, chills, abdominal pain, nausea, vomiting, diarrhea, constipation. Patient states that she tried to contact her primary care physician today but they were out of town because of the holiday. ROS:   Pertinent positives and negatives are stated within HPI, all other systems reviewed and are negative.  --------------------------------------------- PAST HISTORY ---------------------------------------------  Past Medical History:  has a past medical history of Heart murmur, Hypothyroidism, and Liver cirrhosis ([facility] [address] Past Surgical History:  has a past surgical history that includes Appendectomy; Cholecystectomy; Hysterectomy; and Ovary removal.    Social History:  reports that she has never smoked. She has never used smokeless tobacco. She reports that she does not drink alcohol or use drugs. Family History: family history includes Cancer in her brother. The patients home medications have been reviewed. Allergies: Other and Peanuts [peanut oil]    -------------------------------------------------- RESULTS -------------------------------------------------  All laboratory and radiology results have been personally reviewed by myself   LABS:  No results found for this visit on 09/08/20. RADIOLOGY:  Interpreted by Radiologist.  No orders to display       ------------------------- NURSING NOTES AND VITALS REVIEWED ---------------------------   The nursing notes within the ED encounter and vital signs as below have been reviewed.    /68   Pulse 90   Temp 97.3 °F (36.3 °C) (Temporal)   Resp 16   Ht 5' 4\" (1.626 m)   Wt 155 lb (70.3 kg)   SpO2 98%   BMI 26.61 kg/m²   Oxygen Saturation Interpretation: Normal      ---------------------------------------------------PHYSICAL EXAM--------------------------------------      Constitutional/General: Alert and oriented x3, well appearing, non toxic in NAD  Head: NC/AT  Eyes: PERRL, EOMI  Mouth: Oropharynx clear, handling secretions, no trismus  Neck: Supple, full ROM, no meningeal signs  Pulmonary: Lungs clear to auscultation bilaterally, no wheezes, rales, or rhonchi. Not in respiratory distress  Cardiovascular:  Regular rate and rhythm, no murmurs, gallops, or rubs. 2+ distal pulses  Abdomen: Soft, non tender, non distended, NABS. Extremities: Moves all extremities x 4. Warm and well perfused  Skin: warm and dry without rash  Neurologic: GCS 15, CN 2-12 intact. Psych: Normal Affect      ------------------------------ ED COURSE/MEDICAL DECISION MAKING----------------------  Medications - No data to display      Medical Decision Making: We will provide patient with barrier cream and ABDs. No colostomy bag available to put over the drainage site. I instructed the patient to contact her primary care physician for an evaluation this week and to return to the emergency department if she begins having any worsening symptoms. She is agreeable with plan of care. Counseling: The emergency provider has spoken with the patient and discussed todays results, in addition to providing specific details for the plan of care and counseling regarding the diagnosis and prognosis. Questions are answered at this time and they are agreeable with the plan.      --------------------------------- IMPRESSION AND DISPOSITION ---------------------------------    IMPRESSION  1.  Status post abdominal paracentesis        DISPOSITION  Disposition: Discharge to home  Patient condition is good                  Nayla Goss MD  09/08/20 [id number]

## 2020-09-08 NOTE — ED NOTES
Dressing to left lower abdomen removed. + drainage noted from area. Barrier cream, ABD pad, and paper tape placed. Patient tolerated well. Instructed on wound care. Discharge instructions given. Patient states verbal understanding. Ambulatory to exit.        Lillian Rodriguez RN  09/08/20 [id number]

## 2020-11-03 NOTE — PROGRESS NOTES
[address] [facility]                                                                                                                    PRE OP INSTRUCTIONS FOR  Andrew Oshea        Date: 11/3/2020    Date of surgery: 11/4/2020   Arrival Time: Hospital will call you between 5pm and 7pm with your final arrival time for surgery    1. Do not eat or drink anything after midnight prior to surgery. This includes no water, chewing gum, mints or ice chips. 2. Take the following medications with a small sip of water on the morning of Surgery: synthroid, lexapro     3. Diabetics may take evening dose of insulin but none after midnight. If you feel symptomatic or low blood sugar morning of surgery drink 1-2 ounces of apple juice only. 4. Aspirin, Ibuprofen, Advil, Naproxen, Vitamin E and other Anti-inflammatory products should be stopped  before surgery  as directed by your physician. Take Tylenol only unless instructed otherwise by your surgeon. 5. Check with your Doctor regarding stopping Plavix, Coumadin, Lovenox, Eliquis, Effient, or other blood thinners. 6. Do not smoke,use illicit drugs and do not drink any alcoholic beverages 24 hours prior to surgery. 7. You may brush your teeth the morning of surgery. DO NOT SWALLOW WATER    8. You MUST make arrangements for a responsible adult to take you home after your surgery. You will not be allowed to leave alone or drive yourself home. It is strongly suggested someone stay with you the first 24 hrs. Your surgery will be cancelled if you do not have a ride home. 9. PEDIATRIC PATIENTS ONLY:  A parent/legal guardian must accompany a child scheduled for surgery and plan to stay at the hospital until the child is discharged. Please do not bring other children with you.     10. Please wear simple, loose fitting clothing to the hospital.  Janusz Reyes not bring valuables (money, credit cards, checkbooks, etc.) Do not wear any makeup (including no eye makeup) or nail polish on your fingers or toes. 11. DO NOT wear any jewelry or piercings on day of surgery. All body piercing jewelry must be removed. 12. Shower the night before surgery with _x__Antibacterial soap /SISI WIPES________    13. TOTAL JOINT REPLACEMENT/HYSTERECTOMY PATIENTS ONLY---Remember to bring Blood Bank bracelet to the hospital on the day of surgery. 14. If you have a Living Will and Durable Power of  for Healthcare, please bring in a copy. 15. If appropriate bring crutches, inspirex, WALKER, CANE etc... 12. Notify your Surgeon if you develop any illness between now and surgery time, cough, cold, fever, sore throat, nausea, vomiting, etc.  Please notify your surgeon if you experience dizziness, shortness of breath or blurred vision between now & the time of your surgery. 17. If you have ___dentures, they will be removed before going to the OR; we will provide you a container. If you wear ___contact lenses or ___glasses, they will be removed; please bring a case for them. 18. To provide excellent care visitors will be limited to 1 in the room at any given time. 19. Please bring picture ID and insurance card. 20. Sleep apnea patients need to bring CPAP AND SETTINGS to hospital on day of surgery. 21. During flu season no children under the age of 15 are permitted in the hospital for the safety of all patients. 22. Other please check in at the information desk. Wear a mask. Please call AMBULATORY CARE if you have any further questions.    [facility]     [address]

## 2020-11-03 NOTE — H&P
GI H&P NOTE    [facility] Vivi Bennett M.D., Dr. Gilmar Roger M.D., Dr. Miah Sweeney D.O., Dr. Joan Garcia M.D., Dr. Sharmila Naqvi D.O., GI fellow      Date:2:44 PM 11/3/2020    [place]  77 y.o.  female    HPI:    76 y/o F with PMHx of cirrhosis, DM, hypothyroidism, and sleep apnea presents for an anterograde balloon enteroscopy after recent work up for anemia including EGD, colonoscopy, and VCE found likely etiology. After noncontributory EGD and colonoscopy patient had VCE on 09-22-20 that revealed \"several AVMs ranging from small to large throughout the entire small bowel. There are two actively bleeding AVMs in the mid jejunum. The other AVMs are scattered throughout the distal jejunum to terminal ileum. These are not actively bleeding. \" After VCE it was determined that actively bleeding jejunal AVMs can could be further evaluated and treated with anterograde balloon enteroscopy. PAST MEDICAL Hx:  Past Medical History:   Diagnosis Date    Anemia     Diabetes mellitus ([address]     Heart murmur     Hypothyroidism     Liver cirrhosis (HCC)     Sleep apnea     wears CPAP       PAST SURGICAL Hx:   Past Surgical History:   Procedure Laterality Date    APPENDECTOMY      CHOLECYSTECTOMY      COLONOSCOPY      ENDOSCOPY, COLON, DIAGNOSTIC      HYSTERECTOMY      OVARY REMOVAL      TONSILLECTOMY         FAMILY Hx:  Family History   Problem Relation Age of Onset    Cancer Brother        HOME MEDICATIONS:  Prior to Admission medications    Medication Sig Start Date End Date Taking?  Authorizing Provider   metFORMIN (GLUCOPHAGE) 500 MG tablet Take 500 mg by mouth 2 times daily (with meals)   Yes Historical Provider, MD   Ferrous Sulfate (IRON PO) Take by mouth daily   Yes Historical Provider, MD   pantoprazole (PROTONIX) 40 MG tablet Take 40 mg by mouth daily    Historical Provider, MD   magnesium (MAGNESIUM-OXIDE) 250 MG TABS tablet Take 250 mg by mouth daily    Historical Provider, MD   escitalopram (LEXAPRO) 10 MG tablet Take 10 mg by mouth daily    Historical Provider, MD   vitamin E 400 UNIT capsule Take 400 Units by mouth daily    Historical Provider, MD   calcium carbonate 600 MG TABS tablet Take 1 tablet by mouth daily    Historical Provider, MD   vitamin D (CHOLECALCIFEROL) 1000 UNIT TABS tablet Take 1,000 Units by mouth daily    Historical Provider, MD   levothyroxine (SYNTHROID) 50 MCG tablet Take 75 mcg by mouth Daily     Historical Provider, MD   simvastatin (ZOCOR) 20 MG tablet Take 20 mg by mouth nightly    Historical Provider, MD   gabapentin (NEURONTIN) 300 MG capsule Take 600 mg by mouth nightly.      Historical Provider, MD       ALLERGIES:  Other and Peanuts [peanut oil]    SOCIAL Hx:  Social History     Socioeconomic History    Marital status:      Spouse name: Not on file    Number of children: Not on file    Years of education: Not on file    Highest education level: Not on file   Occupational History    Not on file   Social Needs    Financial resource strain: Not on file    Food insecurity     Worry: Not on file     Inability: Not on file    Transportation needs     Medical: Not on file     Non-medical: Not on file   Tobacco Use    Smoking status: Never Smoker    Smokeless tobacco: Never Used   Substance and Sexual Activity    Alcohol use: No    Drug use: No    Sexual activity: Not on file   Lifestyle    Physical activity     Days per week: Not on file     Minutes per session: Not on file    Stress: Not on file   Relationships    Social connections     Talks on phone: Not on file     Gets together: Not on file     Attends Gnosticist service: Not on file     Active member of club or organization: Not on file     Attends meetings of clubs or organizations: Not on file     Relationship status: Not on file    Intimate partner violence     Fear of current or ex partner: Not on file     Emotionally abused: Not on file     Physically abused: Not on file     Forced sexual activity: Not on file   Other Topics Concern    Not on file   Social History Narrative    Not on file       PE:  There were no vitals taken for this visit. General: A&Ox 3, friendly, NAD  HEENT: Atraumatic, symmetric, no anterior/posterior lymphadenopathy, moist mucous membranes, PERRL, EOM intact, fair dentition. Pulm: CTAB, Neg w/r/r, normal chest expansion, no crackles noted  Cardio: RRR, neg m/r/g, nl S1 and S2, no extra heart sounds  Abd.: soft, NT, ND, BS+, no G/R, no HSM  Ext: +2/4 pulse Dp and radial b/l, LE and UE ROM intact,   Skin: No lesions, excoriations, petechiae, or ecchymoses noted    Neuro: normal sensation throughout, DTRs patellar, tricept, and bicept 2/4 b/l and equal, normal muscle strength throughout. DATA:     Lab Results   Component Value Date    WBC 3.2 01/14/2020    RBC 3.19 01/14/2020    HGB 9.9 01/14/2020    HCT 32.0 01/14/2020    .3 01/14/2020    MCH 31.0 01/14/2020    MCHC 30.9 01/14/2020    RDW 12.8 01/14/2020    PLT 97 01/14/2020    MPV 10.4 01/14/2020     Lab Results   Component Value Date     01/14/2020    K 4.6 01/14/2020     01/14/2020    CO2 24 01/14/2020    BUN 17 01/14/2020    CREATININE 1.0 01/14/2020    CALCIUM 9.6 01/14/2020    PROT 7.7 01/14/2020    LABALBU 3.9 01/14/2020    BILITOT 0.6 01/14/2020    ALKPHOS 63 01/14/2020    AST 29 01/14/2020    ALT 13 01/14/2020     No results found for: LIPASE  No results found for: AMYLASE      ASSESSMENT/PLAN:  1.  Blood loss anemia secondary to GI bleed  -Likely secondary to actively bleeding jejunal AVM's  -Recent hemoglobin from 10-13-20 was 8.9  -Risk and benefits of the procedure discussed and patient agrees to proceed  -Plan for anterograde balloon enteroscopy       Other medical co-morbidities:  · Cirrhosis  · Hypothyroidism  · DM  · Sleep apnea      Pt was seen, d/w, and examined with Dr. Ankush Alaniz DO  11/3/2020  2:44

## 2020-11-04 NOTE — ANESTHESIA POSTPROCEDURE EVALUATION
Department of Anesthesiology  Postprocedure Note    Patient: Tamara Jackson  MRN: [id number]  YOB: 1945  Date of evaluation: 11/4/2020  Time:  11:11 AM     Procedure Summary     Date:  11/04/20 Room / Location:  [address] [place] [address] 644 / [address] [facility], [place], [facility]    Anesthesia Start:  0405 Anesthesia Stop:  6091    Procedures:       ANTROGRADE BALLOON ENTEROSCOPY WITH C-ARM AND TATOO (N/A )      ENTEROSCOPY PUSH CONTROL HEMORRHAGE APC AT 30      ENTEROSCOPY FLUORO Diagnosis:  (anemia)    Surgeon:  Alina Tena MD Responsible Provider:  Ami Calderon MD    Anesthesia Type:  general ASA Status:  3          Anesthesia Type: general    Luis Phase I: Luis Score: 10    Luis Phase II: Luis Score: 10    Last vitals: Reviewed and per EMR flowsheets.        Anesthesia Post Evaluation    Patient location during evaluation: PACU  Patient participation: complete - patient participated  Level of consciousness: awake  Pain score: 0  Airway patency: patent  Nausea & Vomiting: no nausea  Complications: no  Cardiovascular status: blood pressure returned to baseline  Respiratory status: acceptable  Hydration status: euvolemic

## 2020-11-04 NOTE — ANESTHESIA PRE PROCEDURE
 0.9 % sodium chloride infusion   Intravenous Continuous Joséammaneno-Sandi Panchal MD           Allergies: Allergies   Allergen Reactions    Other      Peas, all beans    Peanuts [Peanut Oil] Swelling       Problem List:    Patient Active Problem List   Diagnosis Code    Midline low back pain with sciatica M54.40       Past Medical History:        Diagnosis Date    Anemia     Diabetes mellitus ([facility] [address]     Heart murmur     Hypothyroidism     Liver cirrhosis ([facility] [address]     Sleep apnea     wears CPAP       Past Surgical History:        Procedure Laterality Date    APPENDECTOMY      CHOLECYSTECTOMY      COLONOSCOPY      ENDOSCOPY, COLON, DIAGNOSTIC      HYSTERECTOMY      OVARY REMOVAL      TONSILLECTOMY         Social History:    Social History     Tobacco Use    Smoking status: Never Smoker    Smokeless tobacco: Never Used   Substance Use Topics    Alcohol use: No                                Counseling given: Not Answered      Vital Signs (Current):   Vitals:    11/04/20 0654   BP: 116/60   Pulse: 65   Resp: 16   Temp: 97.5 °F (36.4 °C)   TempSrc: Temporal   SpO2: 97%   Weight: 147 lb (66.7 kg)   Height: 5' 4\" (1.626 m)                                              BP Readings from Last 3 Encounters:   11/04/20 116/60   09/08/20 120/68   01/14/20 118/72       NPO Status: Time of last liquid consumption: 2230                        Time of last solid consumption: 2230                        Date of last liquid consumption: 11/03/20                        Date of last solid food consumption: 11/03/20    BMI:   Wt Readings from Last 3 Encounters:   11/04/20 147 lb (66.7 kg)   09/08/20 155 lb (70.3 kg)   01/14/20 153 lb (69.4 kg)     Body mass index is 25.23 kg/m².     CBC:   Lab Results   Component Value Date    WBC 3.8 11/04/2020    RBC 3.10 11/04/2020    HGB 9.8 11/04/2020    HCT 30.6 11/04/2020    MCV 98.7 11/04/2020    RDW 15.6 11/04/2020     11/04/2020       CMP:   Lab Results   Component Value Date     11/04/2020    K 3.7 11/04/2020     11/04/2020    CO2 26 11/04/2020    BUN 19 11/04/2020    CREATININE 1.1 11/04/2020    GFRAA 59 11/04/2020    LABGLOM 48 11/04/2020    GLUCOSE 114 11/04/2020    PROT 7.5 11/04/2020    CALCIUM 9.1 11/04/2020    BILITOT 0.7 11/04/2020    ALKPHOS 106 11/04/2020    AST 23 11/04/2020    ALT 11 11/04/2020       POC Tests: No results for input(s): POCGLU, POCNA, POCK, POCCL, POCBUN, POCHEMO, POCHCT in the last 72 hours. Coags: No results found for: PROTIME, INR, APTT    HCG (If Applicable): No results found for: PREGTESTUR, PREGSERUM, HCG, HCGQUANT     ABGs: No results found for: PHART, PO2ART, FVE1OOF, NWH7XXL, BEART, H4VJMFSH     Type & Screen (If Applicable):  No results found for: LABABO, LABRH    Drug/Infectious Status (If Applicable):  No results found for: HIV, HEPCAB    COVID-19 Screening (If Applicable):   Lab Results   Component Value Date    COVID19 Not Detected 10/29/2020         Anesthesia Evaluation  Patient summary reviewed  Airway: Mallampati: III  TM distance: >3 FB   Neck ROM: full  Mouth opening: > = 3 FB Dental:          Pulmonary: breath sounds clear to auscultation  (+) sleep apnea:                             Cardiovascular:    (+) valvular problems/murmurs:,         Rhythm: regular                      Neuro/Psych:   (+) neuromuscular disease:,             GI/Hepatic/Renal:   (+) liver disease (Cirrhosis.):,           Endo/Other:    (+) DiabetesType II DM, , hypothyroidism, blood dyscrasia: anemia:., .                 Abdominal:           Vascular: negative vascular ROS. Anesthesia Plan      general     ASA 3       Induction: intravenous. MIPS: Postoperative opioids intended. Anesthetic plan and risks discussed with patient. Plan discussed with CRNA.     Attending anesthesiologist reviewed and agrees with Akin Leblanc MD   11/4/2020

## 2020-11-04 NOTE — PROCEDURES
Single Balloon Enteroscopy of the Small Bowel    Indication:  GI bleeding with two actively bleeding AVMs in the mid jejunum on recent VCE    Sedation:  General     Estimated Blood Loss:  3 cc       Procedure:  Endoscope was advanced easily through mouth to likely mid jejunum judging from look of the mucosa and number of passes. Procedure done under fluoroscopy with total duration of procedure about 70 minutes. Findings:  Oropharynx:   Views are limited but grossly normal.  Esophagus:   Mild esophagitis. No bleeding. Stomach:   Mild antral erythema with friable mucosa and mild pyloric stenosis     Gastric body is normal.    Retroflexed views show normal fundus and cardia. There is no fresh or old blood seen  Duodenum: Bulb is normal.    Second portion of duodenum with retained food and debris, limiting evaluation of mucosa. Free flow of green/yellow bile seen in duodenum    Jejunum:   Mucosa is normal with lush villi and no blood (fresh or old). Two non bleeding AVMs in proximal to mid jejunum were identified and successfully cauterized with APC at 27 W, with good hemostasis. No inflamation and no masses seen. There is no active bleeding or old blood. Point of deepest entry was labeled with submucosal injection of SPOT ink. IMPRESSION AND PLAN:   1) Mild esophagitis    2) Mild antral erythema with friable mucosa and mild pyloric stenosis     3) Second portion of duodenum with retained food and debris, limiting evaluation of mucosa. 4) Two non bleeding AVMs in proximal to mid jejunum were identified and successfully cauterized with APC at 27 [address], with good hemostasis. Point of deepest entry was labeled with submucosal injection of SPOT ink. No source of bleeding seen on anterograde balloon enteroscopy. Continue Pantoprazole 40mg daily. Recommend repeat CBC in 2-4 weeks.        Sadaf Evans  9:38 AM     I was present for entire duration of procedure; discussed findings with resident and agree with recommendations above    Peggy Veras MD  Gastroenterology

## 2021-01-01 ENCOUNTER — APPOINTMENT (OUTPATIENT)
Dept: GENERAL RADIOLOGY | Age: 76
DRG: 082 | End: 2021-01-01
Payer: MEDICARE

## 2021-01-01 ENCOUNTER — HOSPITAL ENCOUNTER (OUTPATIENT)
Age: 76
Discharge: HOME | End: 2021-02-22
Payer: MEDICARE

## 2021-01-01 ENCOUNTER — APPOINTMENT (OUTPATIENT)
Dept: ULTRASOUND IMAGING | Age: 76
DRG: 082 | End: 2021-01-01
Payer: MEDICARE

## 2021-01-01 ENCOUNTER — HOSPITAL ENCOUNTER (INPATIENT)
Age: 76
LOS: 4 days | Discharge: HOME HEALTH | DRG: 433 | End: 2021-03-02
Attending: EMERGENCY MEDICINE | Admitting: INTERNAL MEDICINE
Payer: MEDICARE

## 2021-01-01 ENCOUNTER — APPOINTMENT (OUTPATIENT)
Dept: CT IMAGING | Age: 76
DRG: 082 | End: 2021-01-01
Payer: MEDICARE

## 2021-01-01 ENCOUNTER — APPOINTMENT (OUTPATIENT)
Dept: ULTRASOUND IMAGING | Age: 76
DRG: 433 | End: 2021-01-01
Payer: MEDICARE

## 2021-01-01 ENCOUNTER — HOSPITAL ENCOUNTER (INPATIENT)
Age: 76
LOS: 1 days | DRG: 082 | End: 2021-03-17
Attending: EMERGENCY MEDICINE | Admitting: INTERNAL MEDICINE
Payer: MEDICARE

## 2021-01-01 ENCOUNTER — APPOINTMENT (OUTPATIENT)
Dept: NUCLEAR MEDICINE | Age: 76
DRG: 433 | End: 2021-01-01
Payer: MEDICARE

## 2021-01-01 ENCOUNTER — APPOINTMENT (OUTPATIENT)
Dept: GENERAL RADIOLOGY | Age: 76
DRG: 433 | End: 2021-01-01
Payer: MEDICARE

## 2021-01-01 ENCOUNTER — APPOINTMENT (OUTPATIENT)
Dept: INTERVENTIONAL RADIOLOGY/VASCULAR | Age: 76
DRG: 433 | End: 2021-01-01
Payer: MEDICARE

## 2021-01-01 ENCOUNTER — APPOINTMENT (OUTPATIENT)
Dept: CT IMAGING | Age: 76
DRG: 433 | End: 2021-01-01
Payer: MEDICARE

## 2021-01-01 VITALS
HEART RATE: 80 BPM | DIASTOLIC BLOOD PRESSURE: 60 MMHG | OXYGEN SATURATION: 98 % | BODY MASS INDEX: 27.39 KG/M2 | TEMPERATURE: 97 F | HEIGHT: 65 IN | RESPIRATION RATE: 18 BRPM | SYSTOLIC BLOOD PRESSURE: 106 MMHG | WEIGHT: 164.4 LBS

## 2021-01-01 VITALS
WEIGHT: 165 LBS | SYSTOLIC BLOOD PRESSURE: 56 MMHG | BODY MASS INDEX: 27.49 KG/M2 | OXYGEN SATURATION: 97 % | RESPIRATION RATE: 8 BRPM | DIASTOLIC BLOOD PRESSURE: 35 MMHG | TEMPERATURE: 95.7 F | HEART RATE: 55 BPM | HEIGHT: 65 IN

## 2021-01-01 DIAGNOSIS — R41.82 ALTERED MENTAL STATUS, UNSPECIFIED ALTERED MENTAL STATUS TYPE: ICD-10-CM

## 2021-01-01 DIAGNOSIS — N39.0 URINARY TRACT INFECTION WITHOUT HEMATURIA, SITE UNSPECIFIED: Primary | ICD-10-CM

## 2021-01-01 DIAGNOSIS — K74.60 CIRRHOSIS OF LIVER WITH ASCITES, UNSPECIFIED HEPATIC CIRRHOSIS TYPE (HCC): ICD-10-CM

## 2021-01-01 DIAGNOSIS — R18.8 OTHER ASCITES: ICD-10-CM

## 2021-01-01 DIAGNOSIS — N17.9 AKI (ACUTE KIDNEY INJURY) (HCC): ICD-10-CM

## 2021-01-01 DIAGNOSIS — R18.8 CIRRHOSIS OF LIVER WITH ASCITES, UNSPECIFIED HEPATIC CIRRHOSIS TYPE (HCC): ICD-10-CM

## 2021-01-01 DIAGNOSIS — N17.9 ACUTE RENAL FAILURE, UNSPECIFIED ACUTE RENAL FAILURE TYPE (HCC): Primary | ICD-10-CM

## 2021-01-01 LAB
ALBUMIN FLUID: 1.2 G/DL
ALBUMIN SERPL-MCNC: 3.1 G/DL (ref 3.5–5.2)
ALBUMIN SERPL-MCNC: 3.2 G/DL (ref 3.5–5.2)
ALBUMIN SERPL-MCNC: 3.3 G/DL (ref 3.5–5.2)
ALBUMIN SERPL-MCNC: 3.3 G/DL (ref 3.5–5.2)
ALBUMIN SERPL-MCNC: 3.4 G/DL (ref 3.5–5.2)
ALBUMIN SERPL-MCNC: 3.4 G/DL (ref 3.5–5.2)
ALBUMIN SERPL-MCNC: 3.5 G/DL (ref 3.5–5.2)
ALP BLD-CCNC: 230 U/L (ref 35–104)
ALP BLD-CCNC: 231 U/L (ref 35–104)
ALP BLD-CCNC: 266 U/L (ref 35–104)
ALP BLD-CCNC: 275 U/L (ref 35–104)
ALP BLD-CCNC: 302 U/L (ref 35–104)
ALP BLD-CCNC: 320 U/L (ref 35–104)
ALP BLD-CCNC: 435 U/L (ref 35–104)
ALT SERPL-CCNC: 21 U/L (ref 0–32)
ALT SERPL-CCNC: 21 U/L (ref 0–32)
ALT SERPL-CCNC: 22 U/L (ref 0–32)
ALT SERPL-CCNC: 23 U/L (ref 0–32)
ALT SERPL-CCNC: 23 U/L (ref 0–32)
ALT SERPL-CCNC: 24 U/L (ref 0–32)
ALT SERPL-CCNC: 42 U/L (ref 0–32)
AMMONIA: 57 UMOL/L (ref 11–51)
AMMONIA: 73 UMOL/L (ref 11–51)
AMMONIA: 95 UMOL/L (ref 11–51)
AMORPHOUS: ABNORMAL
AMYLASE: 41 U/L (ref 20–100)
ANION GAP SERPL CALCULATED.3IONS-SCNC: 10 MMOL/L (ref 7–16)
ANION GAP SERPL CALCULATED.3IONS-SCNC: 13 MMOL/L (ref 7–16)
ANION GAP SERPL CALCULATED.3IONS-SCNC: 17 MMOL/L (ref 7–16)
ANION GAP SERPL CALCULATED.3IONS-SCNC: 29 MMOL/L (ref 7–16)
ANION GAP SERPL CALCULATED.3IONS-SCNC: 8 MMOL/L (ref 7–16)
ANION GAP SERPL CALCULATED.3IONS-SCNC: 9 MMOL/L (ref 7–16)
ANION GAP SERPL CALCULATED.3IONS-SCNC: 9 MMOL/L (ref 7–16)
ANISOCYTOSIS: ABNORMAL
APPEARANCE FLUID: NORMAL
APTT: 27.1 SEC (ref 24.5–35.1)
APTT: 31.3 SEC (ref 24.5–35.1)
AST SERPL-CCNC: 47 U/L (ref 0–31)
AST SERPL-CCNC: 48 U/L (ref 0–31)
AST SERPL-CCNC: 49 U/L (ref 0–31)
AST SERPL-CCNC: 54 U/L (ref 0–31)
AST SERPL-CCNC: 54 U/L (ref 0–31)
AST SERPL-CCNC: 58 U/L (ref 0–31)
AST SERPL-CCNC: 90 U/L (ref 0–31)
BACTERIA: ABNORMAL /HPF
BACTERIA: ABNORMAL /HPF
BASOPHILS ABSOLUTE: 0 E9/L (ref 0–0.2)
BASOPHILS ABSOLUTE: 0.02 E9/L (ref 0–0.2)
BASOPHILS ABSOLUTE: 0.03 E9/L (ref 0–0.2)
BASOPHILS ABSOLUTE: 0.06 E9/L (ref 0–0.2)
BASOPHILS ABSOLUTE: 0.06 E9/L (ref 0–0.2)
BASOPHILS RELATIVE PERCENT: 0.2 % (ref 0–2)
BASOPHILS RELATIVE PERCENT: 0.5 % (ref 0–2)
BASOPHILS RELATIVE PERCENT: 0.6 % (ref 0–2)
BASOPHILS RELATIVE PERCENT: 0.8 % (ref 0–2)
BASOPHILS RELATIVE PERCENT: 0.9 % (ref 0–2)
BILIRUB SERPL-MCNC: 2.4 MG/DL (ref 0–1.2)
BILIRUB SERPL-MCNC: 2.6 MG/DL (ref 0–1.2)
BILIRUB SERPL-MCNC: 2.7 MG/DL (ref 0–1.2)
BILIRUB SERPL-MCNC: 2.8 MG/DL (ref 0–1.2)
BILIRUB SERPL-MCNC: 3 MG/DL (ref 0–1.2)
BILIRUB SERPL-MCNC: 3.4 MG/DL (ref 0–1.2)
BILIRUB SERPL-MCNC: 4.6 MG/DL (ref 0–1.2)
BILIRUBIN DIRECT: 2 MG/DL (ref 0–0.3)
BILIRUBIN URINE: NEGATIVE
BILIRUBIN URINE: NEGATIVE
BILIRUBIN, INDIRECT: 1.4 MG/DL (ref 0–1)
BLOOD CULTURE, ROUTINE: NORMAL
BLOOD, URINE: ABNORMAL
BLOOD, URINE: NEGATIVE
BODY FLUID CULTURE, STERILE: NORMAL
BUN BLDV-MCNC: 25 MG/DL (ref 8–23)
BUN BLDV-MCNC: 26 MG/DL (ref 8–23)
BUN BLDV-MCNC: 30 MG/DL (ref 8–23)
BUN BLDV-MCNC: 35 MG/DL (ref 8–23)
BUN BLDV-MCNC: 36 MG/DL (ref 8–23)
BUN BLDV-MCNC: 37 MG/DL (ref 8–23)
BUN BLDV-MCNC: 95 MG/DL (ref 8–23)
BURR CELLS: ABNORMAL
C-REACTIVE PROTEIN: 4.2 MG/DL (ref 0–0.4)
CALCIUM SERPL-MCNC: 10 MG/DL (ref 8.6–10.2)
CALCIUM SERPL-MCNC: 10 MG/DL (ref 8.6–10.2)
CALCIUM SERPL-MCNC: 10.3 MG/DL (ref 8.6–10.2)
CALCIUM SERPL-MCNC: 10.6 MG/DL (ref 8.6–10.2)
CALCIUM SERPL-MCNC: 9.4 MG/DL (ref 8.6–10.2)
CALCIUM SERPL-MCNC: 9.5 MG/DL (ref 8.6–10.2)
CALCIUM SERPL-MCNC: 9.7 MG/DL (ref 8.6–10.2)
CELL COUNT FLUID TYPE: NORMAL
CHLORIDE BLD-SCNC: 106 MMOL/L (ref 98–107)
CHLORIDE BLD-SCNC: 107 MMOL/L (ref 98–107)
CHLORIDE BLD-SCNC: 107 MMOL/L (ref 98–107)
CHLORIDE BLD-SCNC: 108 MMOL/L (ref 98–107)
CHLORIDE BLD-SCNC: 109 MMOL/L (ref 98–107)
CHLORIDE BLD-SCNC: 109 MMOL/L (ref 98–107)
CHLORIDE BLD-SCNC: 98 MMOL/L (ref 98–107)
CHLORIDE URINE RANDOM: 26 MMOL/L
CHOLESTEROL, TOTAL: 113 MG/DL (ref 0–199)
CHP ED QC CHECK: YES
CK MB: 2.1 NG/ML (ref 0–4.3)
CLARITY: CLEAR
CLARITY: CLEAR
CO2: 18 MMOL/L (ref 22–29)
CO2: 20 MMOL/L (ref 22–29)
CO2: 23 MMOL/L (ref 22–29)
CO2: 24 MMOL/L (ref 22–29)
CO2: 25 MMOL/L (ref 22–29)
CO2: 25 MMOL/L (ref 22–29)
CO2: 7 MMOL/L (ref 22–29)
COARSE CASTS, UA: ABNORMAL /LPF (ref 0–2)
COLOR FLUID: YELLOW
COLOR: YELLOW
COLOR: YELLOW
CREAT SERPL-MCNC: 1.3 MG/DL (ref 0.5–1)
CREAT SERPL-MCNC: 1.4 MG/DL (ref 0.5–1)
CREAT SERPL-MCNC: 1.6 MG/DL (ref 0.5–1)
CREAT SERPL-MCNC: 1.7 MG/DL (ref 0.5–1)
CREAT SERPL-MCNC: 1.7 MG/DL (ref 0.5–1)
CREAT SERPL-MCNC: 2.1 MG/DL (ref 0.5–1)
CREAT SERPL-MCNC: 9.1 MG/DL (ref 0.5–1)
CREATININE URINE: 119 MG/DL (ref 29–226)
CRYSTALS, UA: ABNORMAL /HPF
CULTURE, BLOOD 2: NORMAL
D DIMER: 1633 NG/ML DDU
EKG ATRIAL RATE: 71 BPM
EKG ATRIAL RATE: 81 BPM
EKG P AXIS: 19 DEGREES
EKG P AXIS: 39 DEGREES
EKG P-R INTERVAL: 130 MS
EKG P-R INTERVAL: 148 MS
EKG Q-T INTERVAL: 398 MS
EKG Q-T INTERVAL: 442 MS
EKG QRS DURATION: 100 MS
EKG QRS DURATION: 78 MS
EKG QTC CALCULATION (BAZETT): 462 MS
EKG QTC CALCULATION (BAZETT): 480 MS
EKG R AXIS: -22 DEGREES
EKG R AXIS: -24 DEGREES
EKG T AXIS: 42 DEGREES
EKG T AXIS: 70 DEGREES
EKG VENTRICULAR RATE: 71 BPM
EKG VENTRICULAR RATE: 81 BPM
EOSINOPHILS ABSOLUTE: 0.06 E9/L (ref 0.05–0.5)
EOSINOPHILS ABSOLUTE: 0.13 E9/L (ref 0.05–0.5)
EOSINOPHILS ABSOLUTE: 0.15 E9/L (ref 0.05–0.5)
EOSINOPHILS ABSOLUTE: 0.2 E9/L (ref 0.05–0.5)
EOSINOPHILS ABSOLUTE: 0.25 E9/L (ref 0.05–0.5)
EOSINOPHILS RELATIVE PERCENT: 0.9 % (ref 0–6)
EOSINOPHILS RELATIVE PERCENT: 2.6 % (ref 0–6)
EOSINOPHILS RELATIVE PERCENT: 2.8 % (ref 0–6)
EOSINOPHILS RELATIVE PERCENT: 2.9 % (ref 0–6)
EOSINOPHILS RELATIVE PERCENT: 3 % (ref 0–6)
EPITHELIAL CELLS, UA: ABNORMAL /HPF
FLUID TYPE: NORMAL
GFR AFRICAN AMERICAN: 28
GFR AFRICAN AMERICAN: 35
GFR AFRICAN AMERICAN: 35
GFR AFRICAN AMERICAN: 38
GFR AFRICAN AMERICAN: 44
GFR AFRICAN AMERICAN: 48
GFR AFRICAN AMERICAN: 5
GFR NON-AFRICAN AMERICAN: 23 ML/MIN/1.73
GFR NON-AFRICAN AMERICAN: 29 ML/MIN/1.73
GFR NON-AFRICAN AMERICAN: 29 ML/MIN/1.73
GFR NON-AFRICAN AMERICAN: 31 ML/MIN/1.73
GFR NON-AFRICAN AMERICAN: 37 ML/MIN/1.73
GFR NON-AFRICAN AMERICAN: 4 ML/MIN/1.73
GFR NON-AFRICAN AMERICAN: 40 ML/MIN/1.73
GLUCOSE BLD-MCNC: 137 MG/DL (ref 74–99)
GLUCOSE BLD-MCNC: 139 MG/DL
GLUCOSE BLD-MCNC: 140 MG/DL (ref 74–99)
GLUCOSE BLD-MCNC: 144 MG/DL (ref 74–99)
GLUCOSE BLD-MCNC: 146 MG/DL (ref 74–99)
GLUCOSE BLD-MCNC: 147 MG/DL (ref 74–99)
GLUCOSE BLD-MCNC: 182 MG/DL (ref 74–99)
GLUCOSE BLD-MCNC: 92 MG/DL (ref 74–99)
GLUCOSE URINE: NEGATIVE MG/DL
GLUCOSE URINE: NEGATIVE MG/DL
GRAM STAIN ORDERABLE: NORMAL
GRAM STAIN RESULT: NORMAL
HBA1C MFR BLD: 6.3 % (ref 4–5.6)
HCT VFR BLD CALC: 25.7 % (ref 34–48)
HCT VFR BLD CALC: 29.4 % (ref 34–48)
HCT VFR BLD CALC: 30.3 % (ref 34–48)
HCT VFR BLD CALC: 30.4 % (ref 34–48)
HCT VFR BLD CALC: 30.5 % (ref 34–48)
HCT VFR BLD CALC: 30.7 % (ref 34–48)
HDLC SERPL-MCNC: 32 MG/DL
HEMOGLOBIN: 7.9 G/DL (ref 11.5–15.5)
HEMOGLOBIN: 8.8 G/DL (ref 11.5–15.5)
HEMOGLOBIN: 8.9 G/DL (ref 11.5–15.5)
HEMOGLOBIN: 9.3 G/DL (ref 11.5–15.5)
HEMOGLOBIN: 9.4 G/DL (ref 11.5–15.5)
HEMOGLOBIN: 9.5 G/DL (ref 11.5–15.5)
HYALINE CASTS: ABNORMAL /LPF (ref 0–2)
HYPOCHROMIA: ABNORMAL
IMMATURE GRANULOCYTES #: 0.02 E9/L
IMMATURE GRANULOCYTES #: 0.03 E9/L
IMMATURE GRANULOCYTES #: 0.04 E9/L
IMMATURE GRANULOCYTES %: 0.5 % (ref 0–5)
IMMATURE GRANULOCYTES %: 0.6 % (ref 0–5)
IMMATURE GRANULOCYTES %: 0.6 % (ref 0–5)
INR BLD: 1.6
INR BLD: 1.9
INR BLD: 2
INR BLD: 2
KETONES, URINE: NEGATIVE MG/DL
KETONES, URINE: NEGATIVE MG/DL
LACTIC ACID: 2.4 MMOL/L (ref 0.5–2.2)
LDL CHOLESTEROL CALCULATED: 58 MG/DL (ref 0–99)
LEUKOCYTE ESTERASE, URINE: ABNORMAL
LEUKOCYTE ESTERASE, URINE: ABNORMAL
LIPASE: 46 U/L (ref 13–60)
LV EF: 75 %
LVEF MODALITY: NORMAL
LYMPHOCYTES ABSOLUTE: 0.29 E9/L (ref 1.5–4)
LYMPHOCYTES ABSOLUTE: 0.31 E9/L (ref 1.5–4)
LYMPHOCYTES ABSOLUTE: 0.52 E9/L (ref 1.5–4)
LYMPHOCYTES ABSOLUTE: 0.66 E9/L (ref 1.5–4)
LYMPHOCYTES ABSOLUTE: 0.66 E9/L (ref 1.5–4)
LYMPHOCYTES RELATIVE PERCENT: 12.1 % (ref 20–42)
LYMPHOCYTES RELATIVE PERCENT: 12.9 % (ref 20–42)
LYMPHOCYTES RELATIVE PERCENT: 2.6 % (ref 20–42)
LYMPHOCYTES RELATIVE PERCENT: 5.2 % (ref 20–42)
LYMPHOCYTES RELATIVE PERCENT: 9.3 % (ref 20–42)
MAGNESIUM: 1.8 MG/DL (ref 1.6–2.6)
MCH RBC QN AUTO: 32.8 PG (ref 26–35)
MCH RBC QN AUTO: 33 PG (ref 26–35)
MCH RBC QN AUTO: 33.1 PG (ref 26–35)
MCH RBC QN AUTO: 33.2 PG (ref 26–35)
MCH RBC QN AUTO: 33.5 PG (ref 26–35)
MCH RBC QN AUTO: 34.6 PG (ref 26–35)
MCHC RBC AUTO-ENTMCNC: 29.3 % (ref 32–34.5)
MCHC RBC AUTO-ENTMCNC: 29.9 % (ref 32–34.5)
MCHC RBC AUTO-ENTMCNC: 30.5 % (ref 32–34.5)
MCHC RBC AUTO-ENTMCNC: 30.7 % (ref 32–34.5)
MCHC RBC AUTO-ENTMCNC: 30.9 % (ref 32–34.5)
MCHC RBC AUTO-ENTMCNC: 31 % (ref 32–34.5)
MCV RBC AUTO: 106.3 FL (ref 80–99.9)
MCV RBC AUTO: 107 FL (ref 80–99.9)
MCV RBC AUTO: 108.9 FL (ref 80–99.9)
MCV RBC AUTO: 109.7 FL (ref 80–99.9)
MCV RBC AUTO: 113.4 FL (ref 80–99.9)
MCV RBC AUTO: 113.4 FL (ref 80–99.9)
METER GLUCOSE: 131 MG/DL (ref 74–99)
METER GLUCOSE: 137 MG/DL (ref 74–99)
METER GLUCOSE: 138 MG/DL (ref 74–99)
METER GLUCOSE: 138 MG/DL (ref 74–99)
METER GLUCOSE: 139 MG/DL (ref 74–99)
METER GLUCOSE: 142 MG/DL (ref 74–99)
METER GLUCOSE: 145 MG/DL (ref 74–99)
METER GLUCOSE: 147 MG/DL (ref 74–99)
METER GLUCOSE: 150 MG/DL (ref 74–99)
METER GLUCOSE: 175 MG/DL (ref 74–99)
METER GLUCOSE: 176 MG/DL (ref 74–99)
METER GLUCOSE: 190 MG/DL (ref 74–99)
METER GLUCOSE: 212 MG/DL (ref 74–99)
METER GLUCOSE: 214 MG/DL (ref 74–99)
METER GLUCOSE: 216 MG/DL (ref 74–99)
METER GLUCOSE: 227 MG/DL (ref 74–99)
METER GLUCOSE: 286 MG/DL (ref 74–99)
MONOCYTE, FLUID: 84 %
MONOCYTES ABSOLUTE: 0.1 E9/L (ref 0.1–0.95)
MONOCYTES ABSOLUTE: 0.37 E9/L (ref 0.1–0.95)
MONOCYTES ABSOLUTE: 0.41 E9/L (ref 0.1–0.95)
MONOCYTES ABSOLUTE: 0.54 E9/L (ref 0.1–0.95)
MONOCYTES ABSOLUTE: 0.56 E9/L (ref 0.1–0.95)
MONOCYTES RELATIVE PERCENT: 0.9 % (ref 2–12)
MONOCYTES RELATIVE PERCENT: 7.6 % (ref 2–12)
MONOCYTES RELATIVE PERCENT: 8 % (ref 2–12)
MONOCYTES RELATIVE PERCENT: 8.6 % (ref 2–12)
MONOCYTES RELATIVE PERCENT: 8.7 % (ref 2–12)
MYELOCYTE PERCENT: 0.9 % (ref 0–0)
MYELOCYTE PERCENT: 1.7 % (ref 0–0)
NEUTROPHIL, FLUID: 16 %
NEUTROPHILS ABSOLUTE: 3.24 E9/L (ref 1.8–7.3)
NEUTROPHILS ABSOLUTE: 3.84 E9/L (ref 1.8–7.3)
NEUTROPHILS ABSOLUTE: 5.21 E9/L (ref 1.8–7.3)
NEUTROPHILS ABSOLUTE: 5.56 E9/L (ref 1.8–7.3)
NEUTROPHILS ABSOLUTE: 9.02 E9/L (ref 1.8–7.3)
NEUTROPHILS RELATIVE PERCENT: 75 % (ref 43–80)
NEUTROPHILS RELATIVE PERCENT: 75.3 % (ref 43–80)
NEUTROPHILS RELATIVE PERCENT: 78.9 % (ref 43–80)
NEUTROPHILS RELATIVE PERCENT: 83.5 % (ref 43–80)
NEUTROPHILS RELATIVE PERCENT: 92.2 % (ref 43–80)
NITRITE, URINE: NEGATIVE
NITRITE, URINE: NEGATIVE
NUCLEATED CELLS FLUID: 362 /UL
NUCLEATED RED BLOOD CELLS: 0.9 /100 WBC
ORGANISM: ABNORMAL
OVALOCYTES: ABNORMAL
PDW BLD-RTO: 15.5 FL (ref 11.5–15)
PDW BLD-RTO: 16.7 FL (ref 11.5–15)
PDW BLD-RTO: 17.1 FL (ref 11.5–15)
PDW BLD-RTO: 17.1 FL (ref 11.5–15)
PDW BLD-RTO: 17.2 FL (ref 11.5–15)
PDW BLD-RTO: 17.6 FL (ref 11.5–15)
PH UA: 5 (ref 5–9)
PH UA: 5.5 (ref 5–9)
PLATELET # BLD: 113 E9/L (ref 130–450)
PLATELET # BLD: 129 E9/L (ref 130–450)
PLATELET # BLD: 131 E9/L (ref 130–450)
PLATELET # BLD: 147 E9/L (ref 130–450)
PLATELET # BLD: 219 E9/L (ref 130–450)
PLATELET # BLD: 99 E9/L (ref 130–450)
PLATELET CONFIRMATION: NORMAL
PMV BLD AUTO: 10.6 FL (ref 7–12)
PMV BLD AUTO: 10.7 FL (ref 7–12)
PMV BLD AUTO: 10.8 FL (ref 7–12)
PMV BLD AUTO: 10.9 FL (ref 7–12)
POIKILOCYTES: ABNORMAL
POLYCHROMASIA: ABNORMAL
POTASSIUM REFLEX MAGNESIUM: 4.3 MMOL/L (ref 3.5–5)
POTASSIUM REFLEX MAGNESIUM: 4.4 MMOL/L (ref 3.5–5)
POTASSIUM REFLEX MAGNESIUM: 4.6 MMOL/L (ref 3.5–5)
POTASSIUM REFLEX MAGNESIUM: 4.7 MMOL/L (ref 3.5–5)
POTASSIUM REFLEX MAGNESIUM: 6.4 MMOL/L (ref 3.5–5)
POTASSIUM SERPL-SCNC: 4.7 MMOL/L (ref 3.5–5)
POTASSIUM SERPL-SCNC: 4.9 MMOL/L (ref 3.5–5)
POTASSIUM, UR: 32.2 MMOL/L
PRO-BNP: 1175 PG/ML (ref 0–450)
PRO-BNP: 8836 PG/ML (ref 0–450)
PROTEIN FLUID: 2.2 G/DL
PROTEIN UA: 100 MG/DL
PROTEIN UA: NEGATIVE MG/DL
PROTHROMBIN TIME: 17.9 SEC (ref 9.3–12.4)
PROTHROMBIN TIME: 21.3 SEC (ref 9.3–12.4)
PROTHROMBIN TIME: 21.8 SEC (ref 9.3–12.4)
PROTHROMBIN TIME: 22.2 SEC (ref 9.3–12.4)
RBC # BLD: 2.36 E12/L (ref 3.5–5.5)
RBC # BLD: 2.68 E12/L (ref 3.5–5.5)
RBC # BLD: 2.68 E12/L (ref 3.5–5.5)
RBC # BLD: 2.69 E12/L (ref 3.5–5.5)
RBC # BLD: 2.85 E12/L (ref 3.5–5.5)
RBC # BLD: 2.87 E12/L (ref 3.5–5.5)
RBC FLUID: 6000 /UL
RBC UA: ABNORMAL /HPF (ref 0–2)
RBC UA: ABNORMAL /HPF (ref 0–2)
RPR: NORMAL
SARS-COV-2, NAAT: NOT DETECTED
SCHISTOCYTES: ABNORMAL
SEDIMENTATION RATE, ERYTHROCYTE: 100 MM/HR (ref 0–20)
SODIUM BLD-SCNC: 134 MMOL/L (ref 132–146)
SODIUM BLD-SCNC: 139 MMOL/L (ref 132–146)
SODIUM BLD-SCNC: 140 MMOL/L (ref 132–146)
SODIUM BLD-SCNC: 142 MMOL/L (ref 132–146)
SODIUM URINE: <20 MMOL/L
SPECIFIC GRAVITY UA: 1.02 (ref 1–1.03)
SPECIFIC GRAVITY UA: >=1.03 (ref 1–1.03)
T3 UPTAKE PERCENT: 36 % (ref 22.5–37)
T4 FREE: 1.12 NG/DL (ref 0.93–1.7)
T4 FREE: 1.28 NG/DL (ref 0.93–1.7)
TOTAL CK: 154 U/L (ref 20–180)
TOTAL CK: 55 U/L (ref 20–180)
TOTAL PROTEIN: 7.1 G/DL (ref 6.4–8.3)
TOTAL PROTEIN: 7.3 G/DL (ref 6.4–8.3)
TOTAL PROTEIN: 7.6 G/DL (ref 6.4–8.3)
TOTAL PROTEIN: 7.7 G/DL (ref 6.4–8.3)
TOTAL PROTEIN: 8 G/DL (ref 6.4–8.3)
TOTAL PROTEIN: 8 G/DL (ref 6.4–8.3)
TOTAL PROTEIN: 8.2 G/DL (ref 6.4–8.3)
TRIGL SERPL-MCNC: 113 MG/DL (ref 0–149)
TROPONIN: 0.01 NG/ML (ref 0–0.03)
TROPONIN: 0.04 NG/ML (ref 0–0.03)
TROPONIN: <0.01 NG/ML (ref 0–0.03)
TSH SERPL DL<=0.05 MIU/L-ACNC: 0.74 UIU/ML (ref 0.27–4.2)
TSH SERPL DL<=0.05 MIU/L-ACNC: 4.29 UIU/ML (ref 0.27–4.2)
TSH SERPL DL<=0.05 MIU/L-ACNC: 4.42 UIU/ML (ref 0.27–4.2)
URINE CULTURE, ROUTINE: ABNORMAL
UROBILINOGEN, URINE: 0.2 E.U./DL
UROBILINOGEN, URINE: 0.2 E.U./DL
VLDLC SERPL CALC-MCNC: 23 MG/DL
WBC # BLD: 4.3 E9/L (ref 4.5–11.5)
WBC # BLD: 5.1 E9/L (ref 4.5–11.5)
WBC # BLD: 5.3 E9/L (ref 4.5–11.5)
WBC # BLD: 6.2 E9/L (ref 4.5–11.5)
WBC # BLD: 7.1 E9/L (ref 4.5–11.5)
WBC # BLD: 9.6 E9/L (ref 4.5–11.5)
WBC UA: ABNORMAL /HPF (ref 0–5)
WBC UA: ABNORMAL /HPF (ref 0–5)

## 2021-01-01 PROCEDURE — 87205 SMEAR GRAM STAIN: CPT

## 2021-01-01 PROCEDURE — 6360000002 HC RX W HCPCS: Performed by: EMERGENCY MEDICINE

## 2021-01-01 PROCEDURE — 80053 COMPREHEN METABOLIC PANEL: CPT

## 2021-01-01 PROCEDURE — 6370000000 HC RX 637 (ALT 250 FOR IP): Performed by: PHYSICIAN ASSISTANT

## 2021-01-01 PROCEDURE — 0W9G3ZZ DRAINAGE OF PERITONEAL CAVITY, PERCUTANEOUS APPROACH: ICD-10-PCS | Performed by: RADIOLOGY

## 2021-01-01 PROCEDURE — 84443 ASSAY THYROID STIM HORMONE: CPT

## 2021-01-01 PROCEDURE — 82550 ASSAY OF CK (CPK): CPT

## 2021-01-01 PROCEDURE — 97530 THERAPEUTIC ACTIVITIES: CPT

## 2021-01-01 PROCEDURE — 85610 PROTHROMBIN TIME: CPT

## 2021-01-01 PROCEDURE — 2060000000 HC ICU INTERMEDIATE R&B

## 2021-01-01 PROCEDURE — 82140 ASSAY OF AMMONIA: CPT

## 2021-01-01 PROCEDURE — 93306 TTE W/DOPPLER COMPLETE: CPT

## 2021-01-01 PROCEDURE — 72125 CT NECK SPINE W/O DYE: CPT

## 2021-01-01 PROCEDURE — 82962 GLUCOSE BLOOD TEST: CPT

## 2021-01-01 PROCEDURE — 85025 COMPLETE CBC W/AUTO DIFF WBC: CPT

## 2021-01-01 PROCEDURE — 36415 COLL VENOUS BLD VENIPUNCTURE: CPT

## 2021-01-01 PROCEDURE — 76705 ECHO EXAM OF ABDOMEN: CPT

## 2021-01-01 PROCEDURE — 6370000000 HC RX 637 (ALT 250 FOR IP): Performed by: INTERNAL MEDICINE

## 2021-01-01 PROCEDURE — 49083 ABD PARACENTESIS W/IMAGING: CPT | Performed by: RADIOLOGY

## 2021-01-01 PROCEDURE — 82436 ASSAY OF URINE CHLORIDE: CPT

## 2021-01-01 PROCEDURE — P9612 CATHETERIZE FOR URINE SPEC: HCPCS

## 2021-01-01 PROCEDURE — 51702 INSERT TEMP BLADDER CATH: CPT

## 2021-01-01 PROCEDURE — 82042 OTHER SOURCE ALBUMIN QUAN EA: CPT

## 2021-01-01 PROCEDURE — 78582 LUNG VENTILAT&PERFUS IMAGING: CPT

## 2021-01-01 PROCEDURE — 85730 THROMBOPLASTIN TIME PARTIAL: CPT

## 2021-01-01 PROCEDURE — P9047 ALBUMIN (HUMAN), 25%, 50ML: HCPCS | Performed by: INTERNAL MEDICINE

## 2021-01-01 PROCEDURE — 49083 ABD PARACENTESIS W/IMAGING: CPT

## 2021-01-01 PROCEDURE — 97162 PT EVAL MOD COMPLEX 30 MIN: CPT

## 2021-01-01 PROCEDURE — 84484 ASSAY OF TROPONIN QUANT: CPT

## 2021-01-01 PROCEDURE — 96375 TX/PRO/DX INJ NEW DRUG ADDON: CPT

## 2021-01-01 PROCEDURE — 87040 BLOOD CULTURE FOR BACTERIA: CPT

## 2021-01-01 PROCEDURE — 2580000003 HC RX 258: Performed by: STUDENT IN AN ORGANIZED HEALTH CARE EDUCATION/TRAINING PROGRAM

## 2021-01-01 PROCEDURE — 87077 CULTURE AEROBIC IDENTIFY: CPT

## 2021-01-01 PROCEDURE — 2580000003 HC RX 258: Performed by: PHYSICIAN ASSISTANT

## 2021-01-01 PROCEDURE — 70450 CT HEAD/BRAIN W/O DYE: CPT

## 2021-01-01 PROCEDURE — 2580000003 HC RX 258: Performed by: INTERNAL MEDICINE

## 2021-01-01 PROCEDURE — 83880 ASSAY OF NATRIURETIC PEPTIDE: CPT

## 2021-01-01 PROCEDURE — 87070 CULTURE OTHR SPECIMN AEROBIC: CPT

## 2021-01-01 PROCEDURE — 84439 ASSAY OF FREE THYROXINE: CPT

## 2021-01-01 PROCEDURE — 87186 SC STD MICRODIL/AGAR DIL: CPT

## 2021-01-01 PROCEDURE — 81001 URINALYSIS AUTO W/SCOPE: CPT

## 2021-01-01 PROCEDURE — C1729 CATH, DRAINAGE: HCPCS

## 2021-01-01 PROCEDURE — 83690 ASSAY OF LIPASE: CPT

## 2021-01-01 PROCEDURE — 2500000003 HC RX 250 WO HCPCS: Performed by: RADIOLOGY

## 2021-01-01 PROCEDURE — 6370000000 HC RX 637 (ALT 250 FOR IP): Performed by: STUDENT IN AN ORGANIZED HEALTH CARE EDUCATION/TRAINING PROGRAM

## 2021-01-01 PROCEDURE — 93010 ELECTROCARDIOGRAM REPORT: CPT | Performed by: INTERNAL MEDICINE

## 2021-01-01 PROCEDURE — 87635 SARS-COV-2 COVID-19 AMP PRB: CPT

## 2021-01-01 PROCEDURE — 1200000000 HC SEMI PRIVATE

## 2021-01-01 PROCEDURE — 2500000003 HC RX 250 WO HCPCS: Performed by: STUDENT IN AN ORGANIZED HEALTH CARE EDUCATION/TRAINING PROGRAM

## 2021-01-01 PROCEDURE — 71045 X-RAY EXAM CHEST 1 VIEW: CPT

## 2021-01-01 PROCEDURE — 82553 CREATINE MB FRACTION: CPT

## 2021-01-01 PROCEDURE — 86592 SYPHILIS TEST NON-TREP QUAL: CPT

## 2021-01-01 PROCEDURE — 6360000002 HC RX W HCPCS: Performed by: INTERNAL MEDICINE

## 2021-01-01 PROCEDURE — 6360000002 HC RX W HCPCS: Performed by: PHYSICIAN ASSISTANT

## 2021-01-01 PROCEDURE — 84300 ASSAY OF URINE SODIUM: CPT

## 2021-01-01 PROCEDURE — 2140000000 HC CCU INTERMEDIATE R&B

## 2021-01-01 PROCEDURE — 99285 EMERGENCY DEPT VISIT HI MDM: CPT

## 2021-01-01 PROCEDURE — 74150 CT ABDOMEN W/O CONTRAST: CPT

## 2021-01-01 PROCEDURE — 99284 EMERGENCY DEPT VISIT MOD MDM: CPT

## 2021-01-01 PROCEDURE — 84133 ASSAY OF URINE POTASSIUM: CPT

## 2021-01-01 PROCEDURE — 83735 ASSAY OF MAGNESIUM: CPT

## 2021-01-01 PROCEDURE — 93005 ELECTROCARDIOGRAM TRACING: CPT | Performed by: EMERGENCY MEDICINE

## 2021-01-01 PROCEDURE — 84157 ASSAY OF PROTEIN OTHER: CPT

## 2021-01-01 PROCEDURE — 6360000002 HC RX W HCPCS: Performed by: STUDENT IN AN ORGANIZED HEALTH CARE EDUCATION/TRAINING PROGRAM

## 2021-01-01 PROCEDURE — 97165 OT EVAL LOW COMPLEX 30 MIN: CPT

## 2021-01-01 PROCEDURE — 89051 BODY FLUID CELL COUNT: CPT

## 2021-01-01 PROCEDURE — 82570 ASSAY OF URINE CREATININE: CPT

## 2021-01-01 PROCEDURE — 83036 HEMOGLOBIN GLYCOSYLATED A1C: CPT

## 2021-01-01 PROCEDURE — A9539 TC99M PENTETATE: HCPCS | Performed by: RADIOLOGY

## 2021-01-01 PROCEDURE — 85651 RBC SED RATE NONAUTOMATED: CPT

## 2021-01-01 PROCEDURE — 80048 BASIC METABOLIC PNL TOTAL CA: CPT

## 2021-01-01 PROCEDURE — 86140 C-REACTIVE PROTEIN: CPT

## 2021-01-01 PROCEDURE — 80061 LIPID PANEL: CPT

## 2021-01-01 PROCEDURE — 96374 THER/PROPH/DIAG INJ IV PUSH: CPT

## 2021-01-01 PROCEDURE — 82150 ASSAY OF AMYLASE: CPT

## 2021-01-01 PROCEDURE — 97535 SELF CARE MNGMENT TRAINING: CPT

## 2021-01-01 PROCEDURE — 85027 COMPLETE CBC AUTOMATED: CPT

## 2021-01-01 PROCEDURE — 83605 ASSAY OF LACTIC ACID: CPT

## 2021-01-01 PROCEDURE — 87088 URINE BACTERIA CULTURE: CPT

## 2021-01-01 PROCEDURE — A9540 TC99M MAA: HCPCS | Performed by: RADIOLOGY

## 2021-01-01 PROCEDURE — 2580000003 HC RX 258: Performed by: EMERGENCY MEDICINE

## 2021-01-01 PROCEDURE — 71250 CT THORAX DX C-: CPT

## 2021-01-01 PROCEDURE — 80076 HEPATIC FUNCTION PANEL: CPT

## 2021-01-01 PROCEDURE — 78582 LUNG VENTILAT&PERFUS IMAGING: CPT | Performed by: RADIOLOGY

## 2021-01-01 PROCEDURE — 3430000000 HC RX DIAGNOSTIC RADIOPHARMACEUTICAL: Performed by: RADIOLOGY

## 2021-01-01 PROCEDURE — 85378 FIBRIN DEGRADE SEMIQUANT: CPT

## 2021-01-01 RX ORDER — PANTOPRAZOLE SODIUM 40 MG/1
40 TABLET, DELAYED RELEASE ORAL
Status: DISCONTINUED | OUTPATIENT
Start: 2021-01-01 | End: 2021-01-01 | Stop reason: HOSPADM

## 2021-01-01 RX ORDER — PHYTONADIONE 5 MG/1
5 TABLET ORAL ONCE
Status: COMPLETED | OUTPATIENT
Start: 2021-01-01 | End: 2021-01-01

## 2021-01-01 RX ORDER — ONDANSETRON 2 MG/ML
4 INJECTION INTRAMUSCULAR; INTRAVENOUS ONCE
Status: COMPLETED | OUTPATIENT
Start: 2021-01-01 | End: 2021-01-01

## 2021-01-01 RX ORDER — DEXTROSE MONOHYDRATE 25 G/50ML
25 INJECTION, SOLUTION INTRAVENOUS ONCE
Status: COMPLETED | OUTPATIENT
Start: 2021-01-01 | End: 2021-01-01

## 2021-01-01 RX ORDER — MORPHINE SULFATE 4 MG/ML
4 INJECTION, SOLUTION INTRAMUSCULAR; INTRAVENOUS
Status: DISCONTINUED | OUTPATIENT
Start: 2021-01-01 | End: 2021-01-01

## 2021-01-01 RX ORDER — PROMETHAZINE HYDROCHLORIDE 25 MG/1
12.5 TABLET ORAL EVERY 6 HOURS PRN
Status: DISCONTINUED | OUTPATIENT
Start: 2021-01-01 | End: 2021-01-01 | Stop reason: HOSPADM

## 2021-01-01 RX ORDER — CHOLECALCIFEROL (VITAMIN D3) 10 MCG
1 TABLET ORAL DAILY
COMMUNITY

## 2021-01-01 RX ORDER — DEXTROSE MONOHYDRATE 50 MG/ML
100 INJECTION, SOLUTION INTRAVENOUS PRN
Status: DISCONTINUED | OUTPATIENT
Start: 2021-01-01 | End: 2021-01-01 | Stop reason: HOSPADM

## 2021-01-01 RX ORDER — ESCITALOPRAM OXALATE 10 MG/1
10 TABLET ORAL DAILY
Status: DISCONTINUED | OUTPATIENT
Start: 2021-01-01 | End: 2021-01-01 | Stop reason: HOSPADM

## 2021-01-01 RX ORDER — SPIRONOLACTONE 50 MG/1
50 TABLET, FILM COATED ORAL DAILY
Status: ON HOLD | COMMUNITY
End: 2021-01-01 | Stop reason: HOSPADM

## 2021-01-01 RX ORDER — LEVOTHYROXINE SODIUM 0.07 MG/1
75 TABLET ORAL DAILY
Status: DISCONTINUED | OUTPATIENT
Start: 2021-01-01 | End: 2021-01-01 | Stop reason: HOSPADM

## 2021-01-01 RX ORDER — ONDANSETRON 2 MG/ML
4 INJECTION INTRAMUSCULAR; INTRAVENOUS EVERY 6 HOURS PRN
Status: DISCONTINUED | OUTPATIENT
Start: 2021-01-01 | End: 2021-01-01 | Stop reason: HOSPADM

## 2021-01-01 RX ORDER — CALCIUM CARBONATE 500(1250)
500 TABLET ORAL DAILY
Status: DISCONTINUED | OUTPATIENT
Start: 2021-01-01 | End: 2021-01-01 | Stop reason: HOSPADM

## 2021-01-01 RX ORDER — ALBUMIN (HUMAN) 12.5 G/50ML
25 SOLUTION INTRAVENOUS ONCE
Status: COMPLETED | OUTPATIENT
Start: 2021-01-01 | End: 2021-01-01

## 2021-01-01 RX ORDER — NICOTINE POLACRILEX 4 MG
15 LOZENGE BUCCAL PRN
Status: DISCONTINUED | OUTPATIENT
Start: 2021-01-01 | End: 2021-01-01 | Stop reason: HOSPADM

## 2021-01-01 RX ORDER — MORPHINE SULFATE 2 MG/ML
4 INJECTION, SOLUTION INTRAMUSCULAR; INTRAVENOUS
Status: DISCONTINUED | OUTPATIENT
Start: 2021-01-01 | End: 2021-01-01 | Stop reason: HOSPADM

## 2021-01-01 RX ORDER — ONDANSETRON 2 MG/ML
4 INJECTION INTRAMUSCULAR; INTRAVENOUS EVERY 6 HOURS PRN
Status: DISCONTINUED | OUTPATIENT
Start: 2021-01-01 | End: 2021-01-01 | Stop reason: SDUPTHER

## 2021-01-01 RX ORDER — ACETAMINOPHEN 650 MG/1
650 SUPPOSITORY RECTAL EVERY 6 HOURS PRN
Status: DISCONTINUED | OUTPATIENT
Start: 2021-01-01 | End: 2021-01-01 | Stop reason: HOSPADM

## 2021-01-01 RX ORDER — AMOXICILLIN 500 MG/1
500 CAPSULE ORAL EVERY 8 HOURS SCHEDULED
Qty: 15 CAPSULE | Refills: 0 | Status: SHIPPED | OUTPATIENT
Start: 2021-01-01 | End: 2021-01-01

## 2021-01-01 RX ORDER — SODIUM CHLORIDE, SODIUM LACTATE, POTASSIUM CHLORIDE, CALCIUM CHLORIDE 600; 310; 30; 20 MG/100ML; MG/100ML; MG/100ML; MG/100ML
INJECTION, SOLUTION INTRAVENOUS CONTINUOUS
Status: DISCONTINUED | OUTPATIENT
Start: 2021-01-01 | End: 2021-01-01

## 2021-01-01 RX ORDER — ALBUMIN (HUMAN) 12.5 G/50ML
25 SOLUTION INTRAVENOUS EVERY 8 HOURS
Status: DISCONTINUED | OUTPATIENT
Start: 2021-01-01 | End: 2021-01-01

## 2021-01-01 RX ORDER — HEPARIN SODIUM 10000 [USP'U]/ML
5000 INJECTION, SOLUTION INTRAVENOUS; SUBCUTANEOUS EVERY 8 HOURS SCHEDULED
Status: DISCONTINUED | OUTPATIENT
Start: 2021-01-01 | End: 2021-01-01

## 2021-01-01 RX ORDER — MORPHINE SULFATE 2 MG/ML
2 INJECTION, SOLUTION INTRAMUSCULAR; INTRAVENOUS
Status: DISCONTINUED | OUTPATIENT
Start: 2021-01-01 | End: 2021-01-01

## 2021-01-01 RX ORDER — POTASSIUM CHLORIDE 20 MEQ/1
40 TABLET, EXTENDED RELEASE ORAL PRN
Status: DISCONTINUED | OUTPATIENT
Start: 2021-01-01 | End: 2021-01-01 | Stop reason: HOSPADM

## 2021-01-01 RX ORDER — LIDOCAINE HYDROCHLORIDE 20 MG/ML
INJECTION, SOLUTION INFILTRATION; PERINEURAL
Status: COMPLETED | OUTPATIENT
Start: 2021-01-01 | End: 2021-01-01

## 2021-01-01 RX ORDER — FENTANYL CITRATE 50 UG/ML
50 INJECTION, SOLUTION INTRAMUSCULAR; INTRAVENOUS ONCE
Status: COMPLETED | OUTPATIENT
Start: 2021-01-01 | End: 2021-01-01

## 2021-01-01 RX ORDER — ALBUMIN (HUMAN) 12.5 G/50ML
50 SOLUTION INTRAVENOUS ONCE
Status: COMPLETED | OUTPATIENT
Start: 2021-01-01 | End: 2021-01-01

## 2021-01-01 RX ORDER — DEXTROSE MONOHYDRATE 25 G/50ML
12.5 INJECTION, SOLUTION INTRAVENOUS PRN
Status: DISCONTINUED | OUTPATIENT
Start: 2021-01-01 | End: 2021-01-01 | Stop reason: HOSPADM

## 2021-01-01 RX ORDER — SODIUM CHLORIDE 0.9 % (FLUSH) 0.9 %
10 SYRINGE (ML) INJECTION PRN
Status: DISCONTINUED | OUTPATIENT
Start: 2021-01-01 | End: 2021-01-01 | Stop reason: HOSPADM

## 2021-01-01 RX ORDER — ATORVASTATIN CALCIUM 10 MG/1
10 TABLET, FILM COATED ORAL NIGHTLY
Status: DISCONTINUED | OUTPATIENT
Start: 2021-01-01 | End: 2021-01-01 | Stop reason: HOSPADM

## 2021-01-01 RX ORDER — GABAPENTIN 300 MG/1
300 CAPSULE ORAL NIGHTLY
Qty: 90 CAPSULE | Refills: 3 | Status: SHIPPED
Start: 2021-01-01 | End: 2021-01-01

## 2021-01-01 RX ORDER — LIDOCAINE HYDROCHLORIDE 20 MG/ML
20 INJECTION, SOLUTION EPIDURAL; INFILTRATION; INTRACAUDAL; PERINEURAL ONCE
Status: DISCONTINUED | OUTPATIENT
Start: 2021-01-01 | End: 2021-01-01 | Stop reason: HOSPADM

## 2021-01-01 RX ORDER — LACTULOSE 10 G/15ML
20 SOLUTION ORAL 2 TIMES DAILY
Qty: 946 ML | Refills: 3 | Status: SHIPPED | OUTPATIENT
Start: 2021-01-01

## 2021-01-01 RX ORDER — ACETAMINOPHEN 325 MG/1
650 TABLET ORAL EVERY 6 HOURS PRN
Status: DISCONTINUED | OUTPATIENT
Start: 2021-01-01 | End: 2021-01-01 | Stop reason: HOSPADM

## 2021-01-01 RX ORDER — SODIUM CHLORIDE 0.9 % (FLUSH) 0.9 %
10 SYRINGE (ML) INJECTION EVERY 12 HOURS SCHEDULED
Status: DISCONTINUED | OUTPATIENT
Start: 2021-01-01 | End: 2021-01-01 | Stop reason: HOSPADM

## 2021-01-01 RX ORDER — LACTULOSE 10 G/15ML
20 SOLUTION ORAL 2 TIMES DAILY
Status: DISCONTINUED | OUTPATIENT
Start: 2021-01-01 | End: 2021-01-01 | Stop reason: HOSPADM

## 2021-01-01 RX ORDER — GABAPENTIN 300 MG/1
600 CAPSULE ORAL NIGHTLY
Status: DISCONTINUED | OUTPATIENT
Start: 2021-01-01 | End: 2021-01-01

## 2021-01-01 RX ORDER — SODIUM CHLORIDE 9 MG/ML
1000 INJECTION, SOLUTION INTRAVENOUS CONTINUOUS
Status: DISCONTINUED | OUTPATIENT
Start: 2021-01-01 | End: 2021-01-01

## 2021-01-01 RX ORDER — ATORVASTATIN CALCIUM 10 MG/1
10 TABLET, FILM COATED ORAL DAILY
Status: DISCONTINUED | OUTPATIENT
Start: 2021-01-01 | End: 2021-01-01

## 2021-01-01 RX ORDER — KIT FOR THE PREPARATION OF TECHNETIUM TC 99M PENTETATE 20 MG/1
800 INJECTION, POWDER, LYOPHILIZED, FOR SOLUTION INTRAVENOUS; RESPIRATORY (INHALATION)
Status: COMPLETED | OUTPATIENT
Start: 2021-01-01 | End: 2021-01-01

## 2021-01-01 RX ORDER — LORATADINE 10 MG/1
10 TABLET ORAL DAILY
COMMUNITY

## 2021-01-01 RX ORDER — SODIUM CHLORIDE 9 MG/ML
INJECTION, SOLUTION INTRAVENOUS CONTINUOUS
Status: DISCONTINUED | OUTPATIENT
Start: 2021-01-01 | End: 2021-01-01

## 2021-01-01 RX ORDER — LACTULOSE 10 G/15ML
20 SOLUTION ORAL ONCE
Status: COMPLETED | OUTPATIENT
Start: 2021-01-01 | End: 2021-01-01

## 2021-01-01 RX ORDER — POTASSIUM CHLORIDE 7.45 MG/ML
10 INJECTION INTRAVENOUS PRN
Status: DISCONTINUED | OUTPATIENT
Start: 2021-01-01 | End: 2021-01-01 | Stop reason: HOSPADM

## 2021-01-01 RX ORDER — AMOXICILLIN 250 MG/1
500 CAPSULE ORAL EVERY 8 HOURS SCHEDULED
Status: DISCONTINUED | OUTPATIENT
Start: 2021-01-01 | End: 2021-01-01 | Stop reason: HOSPADM

## 2021-01-01 RX ORDER — CHOLECALCIFEROL (VITAMIN D3) 10 MCG
1 TABLET ORAL DAILY
Status: DISCONTINUED | OUTPATIENT
Start: 2021-01-01 | End: 2021-01-01

## 2021-01-01 RX ORDER — GLYCOPYRROLATE 0.2 MG/ML
0.2 INJECTION INTRAMUSCULAR; INTRAVENOUS EVERY 4 HOURS PRN
Status: DISCONTINUED | OUTPATIENT
Start: 2021-01-01 | End: 2021-01-01 | Stop reason: HOSPADM

## 2021-01-01 RX ORDER — MORPHINE SULFATE 2 MG/ML
2 INJECTION, SOLUTION INTRAMUSCULAR; INTRAVENOUS
Status: DISCONTINUED | OUTPATIENT
Start: 2021-01-01 | End: 2021-01-01 | Stop reason: HOSPADM

## 2021-01-01 RX ORDER — LORAZEPAM 2 MG/ML
1 INJECTION INTRAMUSCULAR EVERY 6 HOURS PRN
Status: DISCONTINUED | OUTPATIENT
Start: 2021-01-01 | End: 2021-01-01 | Stop reason: HOSPADM

## 2021-01-01 RX ADMIN — AMOXICILLIN 500 MG: 250 CAPSULE ORAL at 21:44

## 2021-01-01 RX ADMIN — LEVOTHYROXINE SODIUM 75 MCG: 0.07 TABLET ORAL at 06:15

## 2021-01-01 RX ADMIN — INSULIN LISPRO 1 UNITS: 100 INJECTION, SOLUTION INTRAVENOUS; SUBCUTANEOUS at 20:57

## 2021-01-01 RX ADMIN — Medication 10 ML: at 22:07

## 2021-01-01 RX ADMIN — INSULIN LISPRO 1 UNITS: 100 INJECTION, SOLUTION INTRAVENOUS; SUBCUTANEOUS at 01:11

## 2021-01-01 RX ADMIN — INSULIN LISPRO 1 UNITS: 100 INJECTION, SOLUTION INTRAVENOUS; SUBCUTANEOUS at 22:06

## 2021-01-01 RX ADMIN — LEVOTHYROXINE SODIUM 75 MCG: 0.07 TABLET ORAL at 06:00

## 2021-01-01 RX ADMIN — LACTULOSE 20 G: 20 SOLUTION ORAL at 08:22

## 2021-01-01 RX ADMIN — ENOXAPARIN SODIUM 40 MG: 40 INJECTION SUBCUTANEOUS at 12:22

## 2021-01-01 RX ADMIN — INSULIN LISPRO 1 UNITS: 100 INJECTION, SOLUTION INTRAVENOUS; SUBCUTANEOUS at 21:45

## 2021-01-01 RX ADMIN — PANTOPRAZOLE SODIUM 40 MG: 40 TABLET, DELAYED RELEASE ORAL at 05:52

## 2021-01-01 RX ADMIN — SODIUM CHLORIDE, POTASSIUM CHLORIDE, SODIUM LACTATE AND CALCIUM CHLORIDE: 600; 310; 30; 20 INJECTION, SOLUTION INTRAVENOUS at 07:12

## 2021-01-01 RX ADMIN — MORPHINE SULFATE 4 MG: 2 INJECTION, SOLUTION INTRAMUSCULAR; INTRAVENOUS at 03:55

## 2021-01-01 RX ADMIN — GABAPENTIN 600 MG: 300 CAPSULE ORAL at 01:11

## 2021-01-01 RX ADMIN — INSULIN LISPRO 1 UNITS: 100 INJECTION, SOLUTION INTRAVENOUS; SUBCUTANEOUS at 17:19

## 2021-01-01 RX ADMIN — GLYCOPYRROLATE 0.2 MG: 0.2 INJECTION INTRAMUSCULAR; INTRAVENOUS at 03:55

## 2021-01-01 RX ADMIN — INSULIN LISPRO 1 UNITS: 100 INJECTION, SOLUTION INTRAVENOUS; SUBCUTANEOUS at 17:41

## 2021-01-01 RX ADMIN — DEXTROSE MONOHYDRATE 25 G: 25 INJECTION, SOLUTION INTRAVENOUS at 16:07

## 2021-01-01 RX ADMIN — INSULIN HUMAN 10 UNITS: 100 INJECTION, SOLUTION PARENTERAL at 16:08

## 2021-01-01 RX ADMIN — MORPHINE SULFATE 4 MG: 2 INJECTION, SOLUTION INTRAMUSCULAR; INTRAVENOUS at 23:56

## 2021-01-01 RX ADMIN — CEFTRIAXONE SODIUM 1000 MG: 1 INJECTION, POWDER, FOR SOLUTION INTRAMUSCULAR; INTRAVENOUS at 20:54

## 2021-01-01 RX ADMIN — ATORVASTATIN CALCIUM 10 MG: 10 TABLET, FILM COATED ORAL at 20:55

## 2021-01-01 RX ADMIN — INSULIN LISPRO 1 UNITS: 100 INJECTION, SOLUTION INTRAVENOUS; SUBCUTANEOUS at 08:25

## 2021-01-01 RX ADMIN — ATORVASTATIN CALCIUM 10 MG: 10 TABLET, FILM COATED ORAL at 22:06

## 2021-01-01 RX ADMIN — ONDANSETRON 4 MG: 2 INJECTION INTRAMUSCULAR; INTRAVENOUS at 13:58

## 2021-01-01 RX ADMIN — ESCITALOPRAM 10 MG: 10 TABLET, FILM COATED ORAL at 12:22

## 2021-01-01 RX ADMIN — Medication 10 ML: at 01:11

## 2021-01-01 RX ADMIN — KIT FOR THE PREPARATION OF TECHNETIUM TC 99M PENTETATE 800 MICRO CURIE: 20 INJECTION, POWDER, LYOPHILIZED, FOR SOLUTION INTRAVENOUS; RESPIRATORY (INHALATION) at 21:41

## 2021-01-01 RX ADMIN — LACTULOSE 20 G: 20 SOLUTION ORAL at 21:45

## 2021-01-01 RX ADMIN — ALBUMIN (HUMAN) 25 G: 0.25 INJECTION, SOLUTION INTRAVENOUS at 15:36

## 2021-01-01 RX ADMIN — ATORVASTATIN CALCIUM 10 MG: 10 TABLET, FILM COATED ORAL at 21:45

## 2021-01-01 RX ADMIN — LEVOTHYROXINE SODIUM 75 MCG: 0.07 TABLET ORAL at 05:52

## 2021-01-01 RX ADMIN — Medication 1000 MG: at 16:07

## 2021-01-01 RX ADMIN — AMOXICILLIN 500 MG: 250 CAPSULE ORAL at 13:53

## 2021-01-01 RX ADMIN — PANTOPRAZOLE SODIUM 40 MG: 40 TABLET, DELAYED RELEASE ORAL at 06:15

## 2021-01-01 RX ADMIN — MORPHINE SULFATE 4 MG: 2 INJECTION, SOLUTION INTRAMUSCULAR; INTRAVENOUS at 05:59

## 2021-01-01 RX ADMIN — FENTANYL CITRATE 50 MCG: 50 INJECTION, SOLUTION INTRAMUSCULAR; INTRAVENOUS at 13:58

## 2021-01-01 RX ADMIN — MORPHINE SULFATE 4 MG: 2 INJECTION, SOLUTION INTRAMUSCULAR; INTRAVENOUS at 07:59

## 2021-01-01 RX ADMIN — Medication 10 ML: at 09:46

## 2021-01-01 RX ADMIN — ACETAMINOPHEN 650 MG: 650 SUPPOSITORY RECTAL at 09:21

## 2021-01-01 RX ADMIN — CEFTRIAXONE SODIUM 1000 MG: 1 INJECTION, POWDER, FOR SOLUTION INTRAMUSCULAR; INTRAVENOUS at 22:06

## 2021-01-01 RX ADMIN — LIDOCAINE HYDROCHLORIDE 5 ML: 20 INJECTION, SOLUTION INFILTRATION; PERINEURAL at 11:14

## 2021-01-01 RX ADMIN — MORPHINE SULFATE 2 MG: 2 INJECTION, SOLUTION INTRAMUSCULAR; INTRAVENOUS at 21:51

## 2021-01-01 RX ADMIN — SODIUM BICARBONATE 150 MEQ: 84 INJECTION, SOLUTION INTRAVENOUS at 16:11

## 2021-01-01 RX ADMIN — AMOXICILLIN 500 MG: 250 CAPSULE ORAL at 15:36

## 2021-01-01 RX ADMIN — GLYCOPYRROLATE 0.2 MG: 0.2 INJECTION INTRAMUSCULAR; INTRAVENOUS at 23:56

## 2021-01-01 RX ADMIN — INSULIN LISPRO 3 UNITS: 100 INJECTION, SOLUTION INTRAVENOUS; SUBCUTANEOUS at 13:09

## 2021-01-01 RX ADMIN — SODIUM CHLORIDE 1000 ML: 9 INJECTION, SOLUTION INTRAVENOUS at 13:58

## 2021-01-01 RX ADMIN — PHYTONADIONE 5 MG: 5 TABLET ORAL at 20:54

## 2021-01-01 RX ADMIN — Medication 10 ML: at 08:00

## 2021-01-01 RX ADMIN — ACETAMINOPHEN 650 MG: 325 TABLET ORAL at 20:54

## 2021-01-01 RX ADMIN — LACTULOSE 20 G: 20 SOLUTION ORAL at 12:22

## 2021-01-01 RX ADMIN — LEVOTHYROXINE SODIUM 75 MCG: 0.07 TABLET ORAL at 06:06

## 2021-01-01 RX ADMIN — Medication 4 MG: at 19:05

## 2021-01-01 RX ADMIN — Medication 10 ML: at 21:45

## 2021-01-01 RX ADMIN — ATORVASTATIN CALCIUM 10 MG: 10 TABLET, FILM COATED ORAL at 01:11

## 2021-01-01 RX ADMIN — Medication 10 ML: at 08:22

## 2021-01-01 RX ADMIN — PANTOPRAZOLE SODIUM 40 MG: 40 TABLET, DELAYED RELEASE ORAL at 06:00

## 2021-01-01 RX ADMIN — LACTULOSE 20 G: 20 SOLUTION ORAL at 01:11

## 2021-01-01 RX ADMIN — SODIUM BICARBONATE 50 MEQ: 84 INJECTION, SOLUTION INTRAVENOUS at 16:08

## 2021-01-01 RX ADMIN — PANTOPRAZOLE SODIUM 40 MG: 40 TABLET, DELAYED RELEASE ORAL at 06:06

## 2021-01-01 RX ADMIN — Medication 10 ML: at 08:39

## 2021-01-01 RX ADMIN — LACTULOSE 20 G: 20 SOLUTION ORAL at 22:07

## 2021-01-01 RX ADMIN — Medication 10 ML: at 12:22

## 2021-01-01 RX ADMIN — CEFTRIAXONE SODIUM 1000 MG: 1 INJECTION, POWDER, FOR SOLUTION INTRAMUSCULAR; INTRAVENOUS at 19:50

## 2021-01-01 RX ADMIN — ESCITALOPRAM 10 MG: 10 TABLET, FILM COATED ORAL at 09:43

## 2021-01-01 RX ADMIN — ALBUMIN (HUMAN) 50 G: 0.25 INJECTION, SOLUTION INTRAVENOUS at 23:59

## 2021-01-01 RX ADMIN — MORPHINE SULFATE 4 MG: 2 INJECTION, SOLUTION INTRAMUSCULAR; INTRAVENOUS at 01:55

## 2021-01-01 RX ADMIN — ESCITALOPRAM 10 MG: 10 TABLET, FILM COATED ORAL at 08:22

## 2021-01-01 RX ADMIN — AMOXICILLIN 500 MG: 250 CAPSULE ORAL at 06:15

## 2021-01-01 RX ADMIN — LACTULOSE 20 G: 20 SOLUTION ORAL at 20:54

## 2021-01-01 RX ADMIN — Medication 10 ML: at 20:55

## 2021-01-01 RX ADMIN — Medication 7 MILLICURIE: at 22:13

## 2021-01-01 RX ADMIN — GLYCOPYRROLATE 0.2 MG: 0.2 INJECTION INTRAMUSCULAR; INTRAVENOUS at 07:59

## 2021-01-01 ASSESSMENT — PAIN SCALES - GENERAL
PAINLEVEL_OUTOF10: 5
PAINLEVEL_OUTOF10: 3
PAINLEVEL_OUTOF10: 0
PAINLEVEL_OUTOF10: 7
PAINLEVEL_OUTOF10: 0
PAINLEVEL_OUTOF10: 10
PAINLEVEL_OUTOF10: 0
PAINLEVEL_OUTOF10: 10
PAINLEVEL_OUTOF10: 0
PAINLEVEL_OUTOF10: 10
PAINLEVEL_OUTOF10: 2
PAINLEVEL_OUTOF10: 0
PAINLEVEL_OUTOF10: 0
PAINLEVEL_OUTOF10: 10
PAINLEVEL_OUTOF10: 7

## 2021-01-01 ASSESSMENT — PAIN DESCRIPTION - PAIN TYPE
TYPE: ACUTE PAIN

## 2021-01-01 ASSESSMENT — PAIN DESCRIPTION - DESCRIPTORS
DESCRIPTORS: ACHING;DISCOMFORT
DESCRIPTORS: ACHING;CONSTANT;DISCOMFORT

## 2021-01-01 ASSESSMENT — PAIN DESCRIPTION - ONSET: ONSET: ON-GOING

## 2021-01-01 ASSESSMENT — PAIN DESCRIPTION - LOCATION
LOCATION: ABDOMEN
LOCATION: ABDOMEN
LOCATION: OTHER (COMMENT)

## 2021-01-01 ASSESSMENT — PAIN DESCRIPTION - PROGRESSION: CLINICAL_PROGRESSION: NOT CHANGED

## 2021-01-01 ASSESSMENT — PAIN DESCRIPTION - ORIENTATION
ORIENTATION: MID
ORIENTATION: LOWER

## 2021-01-01 ASSESSMENT — PAIN - FUNCTIONAL ASSESSMENT: PAIN_FUNCTIONAL_ASSESSMENT: PREVENTS OR INTERFERES SOME ACTIVE ACTIVITIES AND ADLS

## 2021-01-01 ASSESSMENT — PAIN DESCRIPTION - FREQUENCY: FREQUENCY: INTERMITTENT

## 2021-02-26 PROBLEM — R41.82 AMS (ALTERED MENTAL STATUS): Status: ACTIVE | Noted: 2021-01-01

## 2021-02-26 PROBLEM — N39.0 UTI (URINARY TRACT INFECTION): Status: ACTIVE | Noted: 2021-01-01

## 2021-02-26 NOTE — ED PROVIDER NOTES
ED Attending: CC:  [facility]  Department of Emergency Medicine   ED  Encounter Note  Admit Date/RoomTime: 2021  4:42 PM  ED Room: 5308/0136-    NAME: Celso Elder  : 1945  MRN: [id number]     Chief Complaint:  Altered Mental Status (family states she has been acting different X one month. alert to self in place in triage) and Urinary Tract Infection (has abx but has not started yet)    HISTORY OF PRESENT ILLNESS        Celso Elder is a 77 y.o. female who presents to the ED by private vehicle for weakness change in mental status over the past 1 month. According to the daughter who lives with her mother she was brought to her gastroenterologist office on Tuesday where she was diagnosed with a urinary tract infection and was told that she likely needs to have fluid drained from her abdomen. She was given 2 prescriptions neither of which were filled, the first was Cipro the second was lactulose. The daughter claims that she typically has her abdomen drained at either [facility] or [facility] every 2 or 3 weeks. Also her daughter states she has been in to become repetitious with her words in a confused manner, not eating well and not ambulating as well as normal with slight tremors to her lower extremities. ROS   Pertinent positives and negatives are stated within HPI, all other systems reviewed and are negative. Past Medical History:  has a past medical history of Anemia, Diabetes mellitus ([address] Heart murmur, Hypothyroidism, Liver cirrhosis ([address] and Sleep apnea. Surgical History:  has a past surgical history that includes Appendectomy; Cholecystectomy; Hysterectomy; Ovary removal; Tonsillectomy; Colonoscopy; Endoscopy, colon, diagnostic; Upper gastrointestinal endoscopy (N/A, 2020); Upper gastrointestinal endoscopy (2020); and Upper gastrointestinal endoscopy (2020). Social History:  reports that she has never smoked. She has never used smokeless tobacco. She reports that she does not drink alcohol or use drugs. Family History: family history includes Cancer in her brother. Allergies: Other and Peanuts [peanut oil]    PHYSICAL EXAM   Oxygen Saturation Interpretation: Normal.        ED Triage Vitals   BP Temp Temp src Pulse Resp SpO2 Height Weight   02/26/21 1529 02/26/21 1452 -- 02/26/21 1452 02/26/21 1529 02/26/21 1452 -- --   105/69 97.7 °F (36.5 °C)  80 16 99 %           Physical Exam  Constitutional/General: Alert, chronically ill-appearing, nontoxic. Slightly jaundiced. HEENT:  NC/NT. PERRLA,  Airway patent. Neck: Supple, full ROM, non tender to palpation in the midline, no stridor, no crepitus, no meningeal signs  Respiratory: Lungs clear to auscultation bilaterally, no wheezes, rales, or rhonchi. Not in respiratory distress  CV:  Regular rate. Regular rhythm. There is a grade 4 harsh blowing systolic murmur with PMI second intercostal space left sternal border  Chest: No chest wall tenderness  GI:  Abdomen Soft, nontender, diffusely distended and tympanic.  +BS. No rebound, guarding, or rigidity. No pulsatile masses. Musculoskeletal: Moves all extremities x 4. Warm and well perfused, no clubbing, cyanosis, or edema. Capillary refill <3 seconds  Integument: skin warm and dry. No rashes. Lymphatic: no lymphadenopathy noted  Neurologic: GCS 15, no focal deficits.       Lab / Imaging Results   (All laboratory and radiology results have been personally reviewed by myself)  Labs:  Results for orders placed or performed during the hospital encounter of 02/26/21   Culture, Blood 1    Specimen: Blood   Result Value Ref Range    Blood Culture, Routine 24 Hours no growth    Culture, Blood 2    Specimen: Blood   Result Value Ref Range    Culture, Blood 2 24 Hours no growth    Culture, Urine    Specimen: Urine, clean catch   Result Value Ref Range Organism Enterococcus faecalis (A)     Urine Culture, Routine >100,000 CFU/ml        Susceptibility    Enterococcus  faecalis - BACTERIAL SUSCEPTIBILITY PANEL BY TIMOTEO     ampicillin <=^2 Sensitive mcg/mL     doxycycline ^8 Intermediate mcg/mL     gentamicin-syn SYN-R Resistant mcg/mL     nitrofurantoin <=^16 Sensitive mcg/mL     vancomycin ^1 Sensitive mcg/mL   COVID-19, Rapid    Specimen: Nasopharyngeal Swab; Nasal   Result Value Ref Range    SARS-CoV-2, NAAT Not Detected Not Detected   Culture, Body Fluid    Specimen: Fluid   Result Value Ref Range    Body Fluid Culture, Sterile Growth not present, incubation continues     Gram Stain Result Refer to ordered Gram stain for results    Gram Stain    Specimen: Fluid   Result Value Ref Range    Gram Stain Orderable       Gram stain performed on unspun fluid  Rare Polymorphonuclear leukocytes  Epithelial cells not seen  No organisms seen     Troponin   Result Value Ref Range    Troponin <0.01 0.00 - 0.03 ng/mL   CBC   Result Value Ref Range    WBC 5.3 4.5 - 11.5 E9/L    RBC 2.87 (L) 3.50 - 5.50 E12/L    Hemoglobin 9.5 (L) 11.5 - 15.5 g/dL    Hematocrit 30.7 (L) 34.0 - 48.0 %    .0 (H) 80.0 - 99.9 fL    MCH 33.1 26.0 - 35.0 pg    MCHC 30.9 (L) 32.0 - 34.5 %    RDW 16.7 (H) 11.5 - 15.0 fL    Platelets 100 989 - 543 E9/L    MPV 10.6 7.0 - 12.0 fL   Basic Metabolic Panel   Result Value Ref Range    Sodium 139 132 - 146 mmol/L    Potassium 4.9 3.5 - 5.0 mmol/L    Chloride 106 98 - 107 mmol/L    CO2 20 (L) 22 - 29 mmol/L    Anion Gap 13 7 - 16 mmol/L    Glucose 146 (H) 74 - 99 mg/dL    BUN 35 (H) 8 - 23 mg/dL    CREATININE 1.7 (H) 0.5 - 1.0 mg/dL    GFR Non-African American 29 >=60 mL/min/1.73    GFR African American 35     Calcium 10.3 (H) 8.6 - 10.2 mg/dL   CK   Result Value Ref Range    Total CK 55 20 - 180 U/L   CK MB   Result Value Ref Range    CK-MB 2.1 0.0 - 4.3 ng/mL   Brain Natriuretic Peptide   Result Value Ref Range    Pro-BNP 1,175 (H) 0 - 450 pg/mL D-Dimer, Quantitative   Result Value Ref Range    D-Dimer, Quant 1633 ng/mL DDU   Amylase   Result Value Ref Range    Amylase 41 20 - 100 U/L   Lipase   Result Value Ref Range    Lipase 46 13 - 60 U/L   Magnesium   Result Value Ref Range    Magnesium 1.8 1.6 - 2.6 mg/dL   Lactic Acid, Plasma   Result Value Ref Range    Lactic Acid 2.4 (H) 0.5 - 2.2 mmol/L   Hepatic Function Panel   Result Value Ref Range    Total Protein 8.0 6.4 - 8.3 g/dL    Albumin 3.3 (L) 3.5 - 5.2 g/dL    Alkaline Phosphatase 302 (H) 35 - 104 U/L    ALT 23 0 - 32 U/L    AST 54 (H) 0 - 31 U/L    Total Bilirubin 3.4 (H) 0.0 - 1.2 mg/dL    Bilirubin, Direct 2.0 (H) 0.0 - 0.3 mg/dL    Bilirubin, Indirect 1.4 (H) 0.0 - 1.0 mg/dL   Urinalysis   Result Value Ref Range    Color, UA Yellow Straw/Yellow    Clarity, UA Clear Clear    Glucose, Ur Negative Negative mg/dL    Bilirubin Urine Negative Negative    Ketones, Urine Negative Negative mg/dL    Specific Gravity, UA 1.025 1.005 - 1.030    Blood, Urine Negative Negative    pH, UA 5.5 5.0 - 9.0    Protein, UA Negative Negative mg/dL    Urobilinogen, Urine 0.2 <2.0 E.U./dL    Nitrite, Urine Negative Negative    Leukocyte Esterase, Urine TRACE (A) Negative   Protime-INR   Result Value Ref Range    Protime 21.3 (H) 9.3 - 12.4 sec    INR 1.9    APTT   Result Value Ref Range    aPTT 31.3 24.5 - 35.1 sec   T3, Uptake   Result Value Ref Range    T3 Uptake 36.0 22.5 - 37.0 %   T4, Free   Result Value Ref Range    T4 Free 1.28 0.93 - 1.70 ng/dL   TSH without Reflex   Result Value Ref Range    TSH 4.420 (H) 0.270 - 4.200 uIU/mL   Ammonia   Result Value Ref Range    Ammonia 57.0 (H) 11.0 - 51.0 umol/L   Microscopic Urinalysis   Result Value Ref Range    Hyaline Casts, UA 3-5 (A) 0 - 2 /LPF    WBC, UA 10-20 (A) 0 - 5 /HPF    RBC, UA NONE 0 - 2 /HPF    Bacteria, UA MODERATE (A) None Seen /HPF   Ammonia   Result Value Ref Range    Ammonia 73.0 (H) 11.0 - 51.0 umol/L   CBC auto differential   Result Value Ref Range WBC 6.2 4.5 - 11.5 E9/L    RBC 2.68 (L) 3.50 - 5.50 E12/L    Hemoglobin 8.9 (L) 11.5 - 15.5 g/dL    Hematocrit 30.4 (L) 34.0 - 48.0 %    .4 (H) 80.0 - 99.9 fL    MCH 33.2 26.0 - 35.0 pg    MCHC 29.3 (L) 32.0 - 34.5 %    RDW 17.2 (H) 11.5 - 15.0 fL    Platelets 619 (L) 756 - 450 E9/L    MPV 10.9 7.0 - 12.0 fL    Neutrophils % 83.5 (H) 43.0 - 80.0 %    Lymphocytes % 5.2 (L) 20.0 - 42.0 %    Monocytes % 8.7 2.0 - 12.0 %    Eosinophils % 0.9 0.0 - 6.0 %    Basophils % 0.9 0.0 - 2.0 %    Neutrophils Absolute 5.21 1.80 - 7.30 E9/L    Lymphocytes Absolute 0.31 (L) 1.50 - 4.00 E9/L    Monocytes Absolute 0.56 0.10 - 0.95 E9/L    Eosinophils Absolute 0.06 0.05 - 0.50 E9/L    Basophils Absolute 0.06 0.00 - 0.20 E9/L    Myelocyte Percent 0.9 0 - 0 %    Anisocytosis 1+     Polychromasia 1+     Hypochromia 1+     Poikilocytes 1+     Ovalocytes 1+    Comprehensive Metabolic Panel w/ Reflex to MG   Result Value Ref Range    Sodium 140 132 - 146 mmol/L    Potassium reflex Magnesium 4.7 3.5 - 5.0 mmol/L    Chloride 107 98 - 107 mmol/L    CO2 23 22 - 29 mmol/L    Anion Gap 10 7 - 16 mmol/L    Glucose 182 (H) 74 - 99 mg/dL    BUN 36 (H) 8 - 23 mg/dL    CREATININE 1.7 (H) 0.5 - 1.0 mg/dL    GFR Non-African American 29 >=60 mL/min/1.73    GFR African American 35     Calcium 10.0 8.6 - 10.2 mg/dL    Total Protein 7.6 6.4 - 8.3 g/dL    Albumin 3.1 (L) 3.5 - 5.2 g/dL    Total Bilirubin 2.6 (H) 0.0 - 1.2 mg/dL    Alkaline Phosphatase 266 (H) 35 - 104 U/L    ALT 22 0 - 32 U/L    AST 49 (H) 0 - 31 U/L   Troponin   Result Value Ref Range    Troponin 0.01 0.00 - 0.03 ng/mL   Comprehensive Metabolic Panel w/ Reflex to MG   Result Value Ref Range    Sodium 142 132 - 146 mmol/L    Potassium reflex Magnesium 4.6 3.5 - 5.0 mmol/L    Chloride 108 (H) 98 - 107 mmol/L    CO2 25 22 - 29 mmol/L    Anion Gap 9 7 - 16 mmol/L    Glucose 147 (H) 74 - 99 mg/dL    BUN 30 (H) 8 - 23 mg/dL    CREATININE 1.6 (H) 0.5 - 1.0 mg/dL WBC 4.3 (L) 4.5 - 11.5 E9/L    RBC 2.36 (L) 3.50 - 5.50 E12/L    Hemoglobin 7.9 (L) 11.5 - 15.5 g/dL    Hematocrit 25.7 (L) 34.0 - 48.0 %    .9 (H) 80.0 - 99.9 fL    MCH 33.5 26.0 - 35.0 pg    MCHC 30.7 (L) 32.0 - 34.5 %    RDW 17.1 (H) 11.5 - 15.0 fL    Platelets 99 (L) 837 - 450 E9/L    MPV 10.8 7.0 - 12.0 fL    Neutrophils % 75.3 43.0 - 80.0 %    Immature Granulocytes % 0.5 0.0 - 5.0 %    Lymphocytes % 12.1 (L) 20.0 - 42.0 %    Monocytes % 8.6 2.0 - 12.0 %    Eosinophils % 3.0 0.0 - 6.0 %    Basophils % 0.5 0.0 - 2.0 %    Neutrophils Absolute 3.24 1.80 - 7.30 E9/L    Immature Granulocytes # 0.02 E9/L    Lymphocytes Absolute 0.52 (L) 1.50 - 4.00 E9/L    Monocytes Absolute 0.37 0.10 - 0.95 E9/L    Eosinophils Absolute 0.13 0.05 - 0.50 E9/L    Basophils Absolute 0.02 0.00 - 0.20 E9/L    Anisocytosis 1+     Polychromasia 1+     Hypochromia 1+     Poikilocytes 1+     Ovalocytes 1+    BODY FLUID CELL COUNT WITH DIFFERENTIAL   Result Value Ref Range    Cell Count Fluid Type Ascites     Color, Fluid Yellow     Appearance, Fluid Cloudy     Nucl Cell, Fluid 362 /uL    RBC, Fluid 6,000 /uL    Neutrophil Count, Fluid 16 %    Monocyte Count, Fluid 84 %   PROTEIN, BODY FLUID   Result Value Ref Range    Protein, Fluid 2.2 Not Established g/dL    Fluid Type Ascites    Albumin, fluid   Result Value Ref Range    Albumin, Fluid 1.2 Not Established g/dL   Platelet Confirmation   Result Value Ref Range    Platelet Confirmation CONFIRMED    Comprehensive Metabolic Panel w/ Reflex to MG   Result Value Ref Range    Sodium 142 132 - 146 mmol/L    Potassium reflex Magnesium 4.3 3.5 - 5.0 mmol/L    Chloride 109 (H) 98 - 107 mmol/L    CO2 25 22 - 29 mmol/L    Anion Gap 8 7 - 16 mmol/L    Glucose 137 (H) 74 - 99 mg/dL    BUN 25 (H) 8 - 23 mg/dL    CREATININE 1.4 (H) 0.5 - 1.0 mg/dL    GFR Non-African American 37 >=60 mL/min/1.73    GFR African American 44     Calcium 9.4 8.6 - 10.2 mg/dL    Total Protein 7.1 6.4 - 8.3 g/dL Albumin 3.3 (L) 3.5 - 5.2 g/dL    Total Bilirubin 2.8 (H) 0.0 - 1.2 mg/dL    Alkaline Phosphatase 231 (H) 35 - 104 U/L    ALT 21 0 - 32 U/L    AST 48 (H) 0 - 31 U/L   Protime-INR   Result Value Ref Range    Protime 17.9 (H) 9.3 - 12.4 sec    INR 1.6    POCT Glucose   Result Value Ref Range    Glucose 139 mg/dL    QC OK?  yes    POCT Glucose   Result Value Ref Range    Meter Glucose 139 (H) 74 - 99 mg/dL   POCT Glucose   Result Value Ref Range    Meter Glucose 227 (H) 74 - 99 mg/dL   POCT Glucose   Result Value Ref Range    Meter Glucose 175 (H) 74 - 99 mg/dL   POCT Glucose   Result Value Ref Range    Meter Glucose 138 (H) 74 - 99 mg/dL   POCT Glucose   Result Value Ref Range    Meter Glucose 190 (H) 74 - 99 mg/dL   POCT Glucose   Result Value Ref Range    Meter Glucose 216 (H) 74 - 99 mg/dL   POCT Glucose   Result Value Ref Range    Meter Glucose 147 (H) 74 - 99 mg/dL   POCT Glucose   Result Value Ref Range    Meter Glucose 286 (H) 74 - 99 mg/dL   POCT Glucose   Result Value Ref Range    Meter Glucose 176 (H) 74 - 99 mg/dL   POCT Glucose   Result Value Ref Range    Meter Glucose 214 (H) 74 - 99 mg/dL   POCT Glucose   Result Value Ref Range    Meter Glucose 142 (H) 74 - 99 mg/dL   POCT Glucose   Result Value Ref Range    Meter Glucose 137 (H) 74 - 99 mg/dL   POCT Glucose   Result Value Ref Range    Meter Glucose 145 (H) 74 - 99 mg/dL   POCT Glucose   Result Value Ref Range    Meter Glucose 150 (H) 74 - 99 mg/dL   POCT Glucose   Result Value Ref Range    Meter Glucose 212 (H) 74 - 99 mg/dL   POCT Glucose   Result Value Ref Range    Meter Glucose 138 (H) 74 - 99 mg/dL   POCT Glucose   Result Value Ref Range    Meter Glucose 131 (H) 74 - 99 mg/dL   EKG 12 Lead   Result Value Ref Range    Ventricular Rate 81 BPM    Atrial Rate 81 BPM    P-R Interval 148 ms    QRS Duration 78 ms    Q-T Interval 398 ms    QTc Calculation (Bazett) 462 ms    P Axis 39 degrees    R Axis -24 degrees    T Axis 42 degrees     Imaging: All Radiology results interpreted by Radiologist unless otherwise noted. US ABDOMEN LIMITED Specify organ? LIVER   Final Result   Cirrhosis and moderate to severe ascites. Status post cholecystectomy. NM LUNG VENT/PERFUSION (VQ)   Final Result   Low probability for pulmonary embolism      CT HEAD WO CONTRAST   Final Result   No acute intracranial abnormality. XR CHEST 1 VIEW   Final Result   No acute process. IR US GUIDED PARACENTESIS    (Results Pending)       ED Course / Medical Decision Making     Medications   cefTRIAXone (ROCEPHIN) 1,000 mg in sterile water 10 mL IV syringe (1,000 mg Intravenous New Bag 2/26/21 1950)   technetium 99m DTPA solution 800 micro curie (800 micro curie Intravenous Given 2/26/21 2141)   technetium albumin aggregated (MAA) solution 7 millicurie (7 millicuries Intravenous Given 2/26/21 2213)   lactulose (CHRONULAC) 10 GM/15ML solution 20 g (20 g Oral Given 2/27/21 0111)   phytonadione (VITAMIN K) tablet 5 mg (5 mg Oral Given 2/27/21 2054)   albumin human 25 % IV solution 50 g (0 g Intravenous Stopped 3/1/21 0059)   lidocaine 2 % injection (5 mLs Intradermal Given 3/2/21 1114)   albumin human 25 % IV solution 25 g (0 g Intravenous Stopped 3/2/21 1642)        Consultations:             IP CONSULT TO HOSPITALIST  IP CONSULT TO SOCIAL WORK  IP CONSULT TO GI  IP CONSULT TO HOME CARE NEEDS        ASSESSMENT     1. Urinary tract infection without hematuria, site unspecified    2. Altered mental status, unspecified altered mental status type    3. Cirrhosis of liver with ascites, unspecified hepatic cirrhosis type ([address]    4.  SHERRIE (acute kidney injury) ([facility] [address] [facility]    5. Other ascites      This patient's ED course included: a personal history and physicial examination, re-evaluation prior to disposition, multiple bedside re-evaluations, IV medications, cardiac monitoring, continuous pulse oximetry and complex medical decision making and emergency management This patient has remained hemodynamically stable, remained unchanged and been closely monitored during their ED course. PLAN   Inpatient Admission to Telemetry Unit. Patient condition is stable. New Medications       Electronically signed by BRITTANY Zhong   DD: 2/26/21  **This report was transcribed using voice recognition software. Every effort was made to ensure accuracy; however, inadvertent computerized transcription errors may be present.   END OF PROVIDER NOTE       Elvis Moncada [place]  03/03/21 1112

## 2021-02-26 NOTE — ED TRIAGE NOTES
FIRST PROVIDER CONTACT ASSESSMENT NOTE      Department of Emergency Medicine   ED  First Provider Note   2/26/21  3:31 PM EST    Chief Complaint: Altered Mental Status (family states she has been acting different X one month. alert to self in place in triage) and Urinary Tract Infection (has abx but has not started yet)      History of Present Illness:    Josef Lord is a 77 y.o. female who presents to the ED by private car for altered mental status. Patient is dementia at baseline. Patient was just worked up by her family doctor and was found to have a urinary tract infection. Patient was supposed to  the medication but has not started it. Per daughter patient is continued to get slightly more confused and she states \"this happens to my dad all the time so I know when you have a problem with your urine it can make your mind confused. \"  Patient denies any fever or chills  Focused Screening Exam:  Constitutional:  Alert, appears stated age and is in no distress.       *ALLERGIES*     Other and Peanuts [peanut oil]     ED Triage Vitals   BP Temp Temp src Pulse Resp SpO2 Height Weight   02/26/21 1529 02/26/21 1452 -- 02/26/21 1452 02/26/21 1529 02/26/21 1452 -- --   105/69 97.7 °F (36.5 °C)  80 16 99 %          Initial Plan of Care:  Initiate Treatment-Testing, Proceed toTreatment Area When Bed Available for ED Attending/MLP to Continue Care    -----------------END OF FIRST PROVIDER CONTACT ASSESSMENT NOTE--------------  Electronically signed by Pidead Allred PA-C   DD: 2/26/21

## 2021-02-27 PROBLEM — R18.8 ASCITES: Status: ACTIVE | Noted: 2021-01-01

## 2021-02-27 NOTE — PROGRESS NOTES
Call placed to IR to see when procedure will be, no answer - message left with IR to see day and time for paracentesis

## 2021-02-27 NOTE — PROGRESS NOTES
OCCUPATIONAL THERAPY INITIAL EVALUATION      Date:2021  Patient Name: Emory Alejandra  MRN: [id number]  : 1945  Room: [address] [place], [place] [address]    Evaluating OT: RENETTA Quinones, OTR/L [id number]    AM-PAC Daily Activity Raw Score:   Recommended Adaptive Equipment:  TBD     Diagnosis: AMS   Referring Practitioner: BRITTANY Alvarenga  Surgery: n/a   Pertinent Medical History: dementia, liver cirrhosis, heart murmur, anemia, DM   Precautions:  Falls     Home Living: Pt presetns as a questionable historian. She reports she lives with daughter in a 1 story home with step(s) to enter and 0 rail(s); bed/bath on main level   Bathroom setup: grab bars, walk in shower (but pt sponge bathes)   Equipment owned: cane, shower chair, BSC  Prior Level of Function: assist with ADLs/IADLs; using ww for functional mobility   Driving: no    Pain Level: 0/10  Cognition: A&O: 2/4 (self, place, month);  Follows 1 step directions, with repetition and increased time   Memory:  fair    Sequencing:  fair    Problem solving:  fair    Judgement/safety:  fair     Functional Assessment:   Initial Eval Status  Date: 21 Treatment Status  Date: Short Term Goals  Treatment frequency: 2-3 x/week   Feeding SUP   IND   Grooming SBA (seated EOB for oral hygiene and facial washing)   IND   UB Dressing Min A (due to limited ROM)   SUP   LB Dressing Min A (pt able to cross BLEs to doff/tyler B socks)  SUP    Bathing Min A (simulated)  SUP    Toileting Min A (assist with balance with claudine hygiene)  SUP   Bed Mobility  Log roll: Min A  Supine to sit: Min A   Sit to supine: Min A   Log roll SBA  Supine to sit: SBA   Sit to supine: SBA   Functional Transfers Sit to stand:Min A   Stand to sit:Min A  Commode: Min A  SBA   Functional Mobility Min A (using ww, to/from bathroom, progressed to SBA)  SBA   Balance Sitting: SBA  Standing: Min A     Activity Tolerance Fair (sat EOB ~8 mins)     Visual/  Perceptual Glasses: yes              Hand dominance: R UE ROM: BUE: elbow flex WFL, shoulder flex grossly 140'  Strength: RUE: grossly 3+/5 LUE: grossly 3+/5   Strength: B WFL  Fine Motor Coordination:  WFL     Hearing: WFL  Sensation:  c/o numbness/tingling in B feet due to neuropathy  Tone:  WFL  Edema: none noted                            Comments:Cleared by RN to see pt. Upon arrival, patient supine in bed and agreeable to OT session. At end of session, patient supine in bed with call light and phone within reach, all lines and tubes intact. Pt would benefit from continued OT to increase functional independence and quality of life. Treatment: Required re-orientation to year. Pt required vc's for proper technique/safety with hand placement/body mechanics/posture for bed mobility/ADLs/functional tranfers/mobility/ww management. Pt required vc's for sequencing/initiation of ADLs/functional transfers. Pt able to  sit EOB ~8 mins to increase core strength/balance/activity tolerance for ease with ADLs. Pt appeared to have tolerated session well and appears motivated/cooperative/pleasant . Pt instructed on use of call light for assistance and fall prevention. Pt demo'ing fair understanding of education provided. Continue to educate. Eval Complexity: Low    Assessment of current deficits   Functional mobility [x]  ADLs [x] Strength [x]  Cognition []  Functional transfers  [x] IADLs [x] Safety Awareness [x]  Endurance [x]  Fine Motor Coordination [] Balance [x] Vision/perception [] Sensation []   Gross Motor Coordination [] ROM [] Delirium []                  Motor Control []    Plan of Care:     Instruction/training on adapted ADL techniques and AE recommendations to increase functional independence within precautions  Training on energy conservation strategies/techniques to improve independence/tolerance for self-care routine  Functional transfer/mobility training/DME recommendations for increased independence, safety, and fall prevention Patient/Family education to increase follow through with safety techniques and functional independence  Recommendation of environmental modifications for increased safety with functional transfers/mobility and ADLs  Cognitive retraining/development of therapeutic activities to improve problem solving, judgement, memory, and attention for increased safety/participation in ADL/IADL tasks  Therapeutic exercise to improve motor endurance, ROM, and functional strength for ADLs/functional transfers  Therapeutic activities to facilitate/challenge dynamic balance, stand tolerance, fine motor dexterity/in-hand manipulation for increased independence with ADLs  Neuro-muscular re-education: facilitation of righting/equilibrium reactions, midline orientation, scapular stability/mobility, normalization of muscle tone, and facilitation of volitional active controled movement  Delirium prevention/treatment    2-3 DAYS/WK FOR 1-2 WEEKS prn    Rehab Potential: Good for established goals, pt. assisted in establishment of goals. LTG: maximize independence with ADLs to return to PLOF    Patient instructed on diagnosis, prognosis/goals and plan of care. Demonstrated fair understanding. [] Malnutrition indicators have been identified and nursing has been notified to ensure a dietitian consult is ordered. Evaluation time includes thorough review of current medical information, gathering information on past medical & social history & PLOF, completion of standardized testing, informal observation of tasks, consultation with other medical professions/disciplines, assessment of data & development of POC/goals.      low Evaluation +     Treatment Time In: 1150        Treatment Time Out: 7263           Treatment Charges: Mins Units   Ther Ex  61891       Manual Therapy [address]       ADL/Home Mgt 81236 10 1   Neuro Re-ed 89764       Group Therapy        Orthotic manage/training  [id number]

## 2021-02-27 NOTE — H&P
[address] [facility]  History and Physical      CHIEF COMPLAINT:    Chief Complaint   Patient presents with    Altered Mental Status     family states she has been acting different X one month. alert to self in place in triage    Urinary Tract Infection     has abx but has not started yet        Patient of Destiny Denson DO presents with:  AMS (altered mental status)    History of Present Illness:   Patient was brought to the hospital by her family because she was apparently acting different than her baseline. The severity is uncertain. There are no other associated symptoms. There are no alleviating or exacerbating factors. She did have an elevated ammonia level overnight and received 20 g of lactulose. It is possible that may have helped. She does not seem noticeably confused to me currently with regular conversation. She is alert and oriented x3. She states that she herself feels a little confused though. She was apparently diagnosed with a UTI recently. However she denies any acute dysuria. She states that sometimes she gets discomfort with urinating but this has been going on for many months. She denies flank pain or abdominal pain. She appeared mildly visibly dyspneic on my visit. When I asked about this, she did endorse some dyspnea and abdominal swelling as well. She states her abdomen has been getting more swollen over a period of weeks. She denies cough, fevers, or wheezing. She also had a mildly abnormal EKG on admission, but denies any chest pain. REVIEW OF SYSTEMS:  Pertinent negatives are above in HPI. 10 point ROS otherwise negative.       Past Medical History:   Diagnosis Date    Anemia     Diabetes mellitus ([address]     Heart murmur     Hypothyroidism     Liver cirrhosis (HCC)     Sleep apnea     wears CPAP         Past Surgical History:   Procedure Laterality Date    APPENDECTOMY      CHOLECYSTECTOMY      COLONOSCOPY      ENDOSCOPY, COLON, DIAGNOSTIC      HYSTERECTOMY      OVARY REMOVAL      TONSILLECTOMY      UPPER GASTROINTESTINAL ENDOSCOPY N/A 11/4/2020    ANTROGRADE BALLOON ENTEROSCOPY WITH C-ARM AND TATOO performed by Saeid Harris MD at [facility]  11/4/2020    ENTEROSCOPY PUSH CONTROL HEMORRHAGE APC AT 30 performed by Saeid Harris MD at [facility]  11/4/2020    ENTEROSCOPY FLUORO performed by Saeid Harris MD at [address]       Medications Prior to Admission:    Medications Prior to Admission: spironolactone (ALDACTONE) 50 MG tablet, Take 50 mg by mouth daily  b complex-C-folic acid (NEPHROCAPS) 1 MG capsule, Take 1 capsule by mouth daily  loratadine (CLARITIN) 10 MG tablet, Take 10 mg by mouth daily  metFORMIN (GLUCOPHAGE) 500 MG tablet, Take 500 mg by mouth 2 times daily (with meals)  Ferrous Sulfate (IRON PO), Take 65 mg by mouth daily   pantoprazole (PROTONIX) 40 MG tablet, Take 40 mg by mouth daily  magnesium (MAGNESIUM-OXIDE) 250 MG TABS tablet, Take 250 mg by mouth daily  escitalopram (LEXAPRO) 10 MG tablet, Take 10 mg by mouth daily  vitamin E 400 UNIT capsule, Take 400 Units by mouth daily  calcium carbonate 600 MG TABS tablet, Take 1 tablet by mouth daily  vitamin D (CHOLECALCIFEROL) 1000 UNIT TABS tablet, Take 1,000 Units by mouth daily  levothyroxine (SYNTHROID) 50 MCG tablet, Take 75 mcg by mouth Daily   simvastatin (ZOCOR) 20 MG tablet, Take 20 mg by mouth nightly  gabapentin (NEURONTIN) 300 MG capsule, Take 600 mg by mouth nightly. Note that the patient's home medications were reviewed and the above list is accurate to the best of my knowledge at the time of the exam.    Allergies: Other and Peanuts [peanut oil]    Social History:    reports that she has never smoked. She has never used smokeless tobacco. She reports that she does not drink alcohol or use drugs. Family History:   family history includes Cancer in her brother.       PHYSICAL EXAM: Vitals:  /78   Pulse 87   Temp 97.7 °F (36.5 °C) (Oral)   Resp 18   Ht 5' 5\" (1.651 m)   Wt 162 lb 9.6 oz (73.8 kg)   SpO2 98%   BMI 27.06 kg/m²       General appearance: NAD, conversant  Eyes: Sclerae anicteric, PERRLA  HEENT: AT/NC, MMM  Neck: FROM, supple, no thyromegaly  Lymph: No cervical / supraclavicular lymphadenopathy  Lungs: Clear to auscultation, WOB normal  CV: RRR, 3/6 AFIA, no lower extremity edema  Abdomen: Soft, moderately distended/protuberant; no masses or HSM, +BS  Extremities: FROM without synovitis. No clubbing or cyanosis of the hands. Skin: Extensive upper chest spider angiomata  Psych: Calm and cooperative. Normal judgement and insight. Normal mood and affect. Neuro: Alert and interactive, face symmetric, speech fluent. A&Ox3. Cannot do backward serial 10's or 7's. Casual conversant is logical.  Clock draw not great. No asterixis. LOC is normal.                LABS:  All labs reviewed.   Of note:  CBC with Differential:    Lab Results   Component Value Date    WBC 5.3 02/26/2021    RBC 2.87 02/26/2021    HGB 9.5 02/26/2021    HCT 30.7 02/26/2021     02/26/2021    .0 02/26/2021    MCH 33.1 02/26/2021    MCHC 30.9 02/26/2021    RDW 16.7 02/26/2021    LYMPHOPCT 9.3 02/22/2021    MONOPCT 7.6 02/22/2021    BASOPCT 0.8 02/22/2021    MONOSABS 0.54 02/22/2021    LYMPHSABS 0.66 02/22/2021    EOSABS 0.20 02/22/2021    BASOSABS 0.06 02/22/2021     CMP:    Lab Results   Component Value Date     02/26/2021    K 4.9 02/26/2021    K 3.7 11/04/2020     02/26/2021    CO2 20 02/26/2021    BUN 35 02/26/2021    CREATININE 1.7 02/26/2021    GFRAA 35 02/26/2021    LABGLOM 29 02/26/2021    GLUCOSE 139 02/26/2021    PROT 8.0 02/26/2021    LABALBU 3.3 02/26/2021    CALCIUM 10.3 02/26/2021    BILITOT 3.4 02/26/2021    ALKPHOS 302 02/26/2021    AST 54 02/26/2021    ALT 23 02/26/2021       Imaging:  I've personally reviewed the patient's CXR: clear    CT head:  No acute intracranial abnormality. EKG:  I've personally reviewed the patient's EKG:  NSR. J point elevation in V2 (isolated)    ASSESSMENT/PLAN:  Principal Problem:    UTI (urinary tract infection)  Active Problems:    AMS (altered mental status)    Hypothyroidism    Liver cirrhosis (HCC)    Diabetes mellitus ([address]    SHERRIE (acute kidney injury) ([address]    Hepatic encephalopathy (HCC)    Abnormal EKG    Ascites  Resolved Problems:    * No resolved hospital problems. *      \"Positive\" U/A but seems chronically positive. Recent positive UA in Jan is polymicrobial.  No real dysuria or other UTI sx. Check UCx    Continue CTX    Hold gabapentin    Hold spironolactone for SHERRIE. Hold IV fluids due to ascites. Appears mildly dyspneic. CXR clear. Abdomen moderately distended. Do para this admission. Lactulose for hyperammonemia    INR 1.9. Bilirubin rising. Transaminases WNL. Check RUQ U/S    GI consult    Monitor trops. CP free. Impressive murmur; check TTE.     Code status: Full  Requires inpatient level of care  Bismark Gupta    6:37 AM  2/27/2021

## 2021-02-28 NOTE — PROGRESS NOTES
Physical Therapy  Physical Therapy Initial Assessment     Name: Jaylyn Gonzalez  : [birth year]  MRN: [id number]    Referring Provider: BRITTANY Doty    Date of Service: 2021    Evaluating PT: Tk Bui PT, DPT, [id number]    Room #: 4961/6503-P  Diagnosis: AMS  PMHx/PSHx: Heart murmur, hypothyroidism, liver cirrhosis, DM, sleep apnea, anemia  Precautions: Fall risk, stress incontinence, [place], bed/chair, alarm    SUBJECTIVE:    Pt lives with daughter in a 2 story house with 1 stair(s) and 1 grab bar to enter. Bed is on first floor and bath is on first floor. Pt ambulated with [employer] prior to admission. OBJECTIVE:   Initial Evaluation  Date: 21 Treatment Date: Short Term/ Long Term   Goals   AM-PAC 6 Clicks 35/12     Was pt agreeable to Eval/treatment? Yes     Does pt have pain? No current complaints of pain     Bed Mobility  Rolling: SBA  Supine to sit: SBA  Sit to supine: NT  Scooting: SBA to EOB  Rolling: Independent   Supine to sit: Independent   Sit to supine: Independent   Scooting: Independent    Transfers Sit to stand: SBA  Stand to sit: SBA  Stand pivot: SBA with [employer]  Sit to stand: Independent   Stand to sit: Independent   Stand pivot: Mod Independent with [employer]   Ambulation   100 feet with [employer] with SBA  200 feet with [employer] Mod Independent    Stair negotiation: ascended and descended NT  1 step(s) with 1 rail(s) Mod Independent    ROM BUE: Refer to OT note  BLE: WFL     Strength BUE: Refer to OT note  BLE: WFL     Balance Sitting EOB: SBA  Dynamic Standing: SBA with [employer]  Sitting EOB: Independent   Dynamic Standing: Mod Independent with [employer]     Pt is A & O x: 4 to person, place, month/year, and situation. Sensation: Pt denies numbness and tingling of extremities. Edema: Unremarkable. Patient education  Pt educated on PT role in acute care setting.     Patient response to education:   Pt verbalized understanding Pt demonstrated skill Pt requires further education in this area   Yes NA No ASSESSMENT:    Comments:   Pt was supine in bed upon room entry, agreeable to PT evaluation. Pt was on bedpan. Pt rolled as she was assisted with self care. Pt then completed sit to stand and pivoted to chair. Pt ambulated short distance down hallway. Gait was slow but steady. Pt ambulated back to chair and was seated. Pt reported she was incontinent of bowel. Pt completed multiple transfers from chair as she was assisted with self care. Pt verbalized understanding to call for assistance when she wishes to return to bed; alarm was activated. Pt was left seated in chair with all needs met at conclusion of session. RN was notified of pt's performance and position in chair. Treatment:  Patient practiced and was instructed in the following treatment:    ? Therapeutic activities: Pt completed all therapeutic activities noted above. Pt was cued for hand placement during sit <> stand transfers. Pt completed multiple transfers from surfaces of varying heights (EOB x1, chair x3). Pt was cued for [employer] technique and safety. Pt's/family goals:   1. To return home. Patient and or family understand(s) diagnosis, prognosis, and plan of care. Yes. PLAN:    Current Treatment Recommendations   [x] Strengthening     [] ROM   [x] Balance Training   [x] Endurance Training   [x] Transfer Training   [x] Gait Training   [x] Stair Training   [] Positioning   [x] Safety and Education Training   [] Patient/Caregiver Education   [] HEP  [] Other     PT care will be provided in accordance with the objectives noted above. Exercises and functional mobility practice will be used as well as appropriate assistive devices or modalities to obtain goals. Patient and family education will also be administered as needed. Frequency of treatments: 2-5x/week x 2-3 days.     Time in: 0925  Time out: 0945    Total Treatment Time 15 minutes Evaluation Time includes thorough review of current medical information, gathering information on past medical history/social history and prior level of function, completion of standardized testing/informal observation of tasks, assessment of data and education on plan of care and goals.     CPT codes:  [] Low Complexity PT evaluation 83933  [x] Moderate Complexity PT evaluation 98767  [] High Complexity PT evaluation 99185  [] PT Re-evaluation 39505  [] Gait training 73010 0 minutes  [] Manual therapy 32053 0 minutes  [x] Therapeutic activities 55536 15 minutes  [] Therapeutic exercises 32373 0 minutes  [] Neuromuscular reeducation 19156 0 minutes     Maude Andrae, PT, DPT  [id number]

## 2021-03-01 PROBLEM — I34.0 MITRAL REGURGITATION: Chronic | Status: ACTIVE | Noted: 2021-01-01

## 2021-03-01 PROBLEM — Q24.8 LEFT VENTRICULAR OUTFLOW TRACT OBSTRUCTION: Chronic | Status: ACTIVE | Noted: 2021-01-01

## 2021-03-01 PROBLEM — I05.0 MITRAL STENOSIS: Status: ACTIVE | Noted: 2021-01-01

## 2021-03-01 NOTE — PROGRESS NOTES
Stephany called and said that paracentesis will no be done today. Dr. Zi Ervin called to notify. New orders placed.

## 2021-03-01 NOTE — PROGRESS NOTES
Called and spoke to floor, due to high volume of patients we are unable to proceed with this procedure in a timely fashion. Procedure to be done early on 3/2/21.

## 2021-03-01 NOTE — CARE COORDINATION
SW spoke with patient in her room. She states she lives home with her  and her daughter. They live in a 2 story home. Patient reports she stays on the first floor. She has a walker but doesn't normally use it, she does state she will start using it when she goes home. Her PCP is Dr Edgar Gilman, pharmacy is [facility] in [place]. She reports no history of HHC or VANESSA. We talked about transition of care, plan is home at discharge. We talked about [facility] but patient declined at this time, she states no needs currently. Her  or daughter will transport home.

## 2021-03-01 NOTE — PROGRESS NOTES
Chief Complaint:  Chief Complaint   Patient presents with    Altered Mental Status     family states she has been acting different X one month. alert to self in place in triage    Urinary Tract Infection     has abx but has not started yet     UTI (urinary tract infection)     Subjective:    She is feels fine today. Still mildly short of breath and abdomen remains swollen. Antsy to get the paracentesis done and get home as soon as possible. Objective:    /71   Pulse 82   Temp 97.8 °F (36.6 °C) (Temporal)   Resp 18   Ht 5' 5\" (1.651 m)   Wt 164 lb 6.4 oz (74.6 kg)   SpO2 94%   BMI 27.36 kg/m²     Current medications that patient is taking have been reviewed. General appearance: NAD, conversant  HEENT: AT/NC, MMM. Mild scleral icterus  Neck: FROM, supple  Lungs: Clear to auscultation, WOB normal  CV: RRR, 3/6 AFIA  Abdomen: Soft, moderately distended/protuberant; no masses or HSM, +BS  Extremities: No peripheral edema or digital cyanosis  Skin: Upper chest spider angiomata  Psych: Calm and cooperative  Neuro: Alert and interactive, face symmetric, moving all extremities, speech fluent.      Labs:  CBC with Differential:    Lab Results   Component Value Date    WBC 4.3 03/01/2021    RBC 2.36 03/01/2021    HGB 7.9 03/01/2021    HCT 25.7 03/01/2021    PLT 99 03/01/2021    .9 03/01/2021    MCH 33.5 03/01/2021    MCHC 30.7 03/01/2021    RDW 17.1 03/01/2021    LYMPHOPCT 12.1 03/01/2021    MONOPCT 8.6 03/01/2021    MYELOPCT 0.9 02/27/2021    BASOPCT 0.5 03/01/2021    MONOSABS 0.37 03/01/2021    LYMPHSABS 0.52 03/01/2021    EOSABS 0.13 03/01/2021    BASOSABS 0.02 03/01/2021     CMP:    Lab Results   Component Value Date     03/01/2021    K 4.4 03/01/2021     03/01/2021    CO2 24 03/01/2021    BUN 26 03/01/2021    CREATININE 1.3 03/01/2021    GFRAA 48 03/01/2021    LABGLOM 40 03/01/2021    GLUCOSE 140 03/01/2021    PROT 7.3 03/01/2021    LABALBU 3.5 03/01/2021 CALCIUM 9.7 03/01/2021    BILITOT 2.4 03/01/2021    ALKPHOS 230 03/01/2021    AST 47 03/01/2021    ALT 21 03/01/2021            Assessment/Plan:  Principal Problem:    UTI (urinary tract infection)  Active Problems:    AMS (altered mental status)    Hypothyroidism    Liver cirrhosis ([facility]    Diabetes mellitus ([facility]    SHERRIE (acute kidney injury) ([facility]    Hepatic encephalopathy (HCC)    Abnormal EKG    Ascites    Mitral stenosis    Mitral regurgitation    Left ventricular outflow tract obstruction  Resolved Problems:    * No resolved hospital problems. *       Switch ceftriaxone to amoxicillin, as her urine culture is growing Enterococcus    Continue lactulose for hyperammonemia although no overt signs of HE. Glucose well controlled    Echocardiogram shows MS, MR, and significant LVOT gradient. The patient follows up with Dr. Ary Madrid although her daughter says she hasn't been back to see him in over a year. Nothing needs to be done for this during this admission, but I strongly encouraged her to make a new follow-up appointment with Dr. Ary Madrid to be seen within next month or so. SHERRIE stable, slightly better. Continue to hold spironolactone. No IV fluids. Too much ascites already. Paracentesis unfortunately delayed till tomorrow. INR elevated. No response to PO VK. She does have some evidence of gradual decompensation of her cirrhosis.     Requires continued inpatient level of care     Jonathan Betancourt    5:41 PM  3/1/2021

## 2021-03-01 NOTE — PLAN OF CARE
Problem: Falls - Risk of:  Goal: Will remain free from falls  Description: Will remain free from falls  3/1/2021 1239 by Cruz Jorgensen RN  Outcome: Met This Shift  3/1/2021 0139 by Price Urbano RN  Outcome: Met This Shift  Goal: Absence of physical injury  Description: Absence of physical injury  3/1/2021 1239 by Cruz Jorgensen RN  Outcome: Met This Shift  3/1/2021 0139 by Price Urbano RN  Outcome: Met This Shift     Problem: Pain:  Goal: Pain level will decrease  Description: Pain level will decrease  3/1/2021 1239 by Cruz Jorgensen RN  Outcome: Met This Shift  3/1/2021 0139 by Price Urbano RN  Outcome: Met This Shift  Goal: Control of acute pain  Description: Control of acute pain  3/1/2021 1239 by Cruz Jorgensen RN  Outcome: Met This Shift  3/1/2021 0139 by Price Urbano RN  Outcome: Met This Shift  Goal: Control of chronic pain  Description: Control of chronic pain  3/1/2021 1239 by Cruz Jorgensen RN  Outcome: Met This Shift  3/1/2021 0139 by Price Urbano RN  Outcome: Met This Shift     Problem: Skin Integrity:  Goal: Will show no infection signs and symptoms  Description: Will show no infection signs and symptoms  Outcome: Met This Shift  Goal: Absence of new skin breakdown  Description: Absence of new skin breakdown  Outcome: Met This Shift

## 2021-03-01 NOTE — CONSULTS
[address]                  [address] [facility],  [facility]                                  CONSULTATION    PATIENT NAME: Alissa Garnica                  :        1945  MED REC NO:   [id number]                            ROOM:       8516  ACCOUNT NO:   [de-identified]                           ADMIT DATE: 2021  PROVIDER:     Liv Houston MD    CONSULT DATE:  2021    REASON FOR CONSULTATION:  Cirrhosis with decompensation. HISTORY:  A 81-year-old woman. Follows with Dr. Rufina Briceño. States she has  an appointment in some time of the next week or so. Followed through  that office for a diagnosis of cirrhosis apparently related to  nonalcoholic fatty liver disease. She presented to the hospital with an  apparent history of altered mental status, but cleared quickly. She  relates no complaints of abdominal pain, nausea or vomiting. Though her  medications taken at home listed only spironolactone. She also suggests  she was taking Lasix. 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 It seems she  underwent a paracentesis in 2020. Now she suggests she has had more  than one paracentesis in the past.  She has accumulated quite a bit of  ascites at this time clinically and by ultrasound that suggests her  abdomen had been distended in the past.    On admission to the hospital, she had BUN of 37 and a creatinine of 2.1. On 2020, the numbers were 23 and 1.3. Her creatinine has since  fallen to 1.7. Her INR in 2020 was 1.2. Today it is 2.0. She has a  platelet count of 926,616.   Her liver function studies reveal that her bilirubin was 1.0 in 12/2020, 3.4 on 02/26/2021 and currently 2.7. She  is scheduled for paracentesis tomorrow. Diuretics have been held. Imaging by ultrasound confirms a large amount of ascites. PAST MEDICAL HISTORY:  Includes hypothyroidism, diabetes, sleep apnea  and cirrhosis. She has had a previous appendectomy, cholecystectomy,  hysterectomy, EGD and colonoscopy and a double balloon enteroscopy  completed last year. MEDICATIONS:  Medications listed at home but apparently incomplete  include spironolactone, Glucophage, iron, Protonix, magnesium, Lexapro,  calcium carbonate, thyroid, simvastatin, and Neurontin. SOCIAL HISTORY:  She is . Never smoked and no alcohol. OBJECTIVE:  GENERAL:  She awakens easily and is alert and oriented in discussion. VITAL SIGNS:  She is afebrile and has been. Pulse is 75, respirations  18 to 20, blood pressure 116/64, room air saturations 95%. Weight is  163 pounds. SKIN:  She is pale but not jaundiced clinically. NECK:  Veins are not distended. LUNGS:  Fields are clear. HEART:  Tones normal.  ABDOMEN:  Rounded, soft. It is not tender. There is no organomegaly. Ascites is clearly present but no hernia. Literally no edema. ASSESSMENT:  Cirrhosis with further decompensation with ascites and  rising bilirubin. She is for paracentesis tomorrow. Renal function has  improved off diuretics. Will receive albumin post paracentesis. There  were no studies that I can see that were run on the last fluid, so will  make sure that the minimum is done including cell count, albumin, total  protein and culture.         Tyler Long MD    D: 02/28/2021 16:39:02       T: 02/28/2021 16:43:46     [email or web address]  Job#: [id number]     Doc#: [id number]    CC:

## 2021-03-02 NOTE — BRIEF OP NOTE
Brief Postoperative Note    Sky Kimball  YOB: 1945  [id number]    Pre-operative Diagnosis and Procedure: 78 yo F with ascites. Here for ultrasound guided paracentesis. Post-operative Diagnosis: Same    Anesthesia: Local    Estimated Blood Loss: < 10 cc    Surgeon: Pari Arizmendi MD    Complications: none    Specimen obtained: serous fluid    Findings: Successful ultrasound guided paracentesis.      Pari Arizmendi MD   3/2/2021 10:43 AM

## 2021-03-02 NOTE — INTERVAL H&P NOTE
Update History & Physical    The patient's History and Physical of February 27, 2021 was reviewed with the patient and I examined the patient. There was no change. The surgical site was confirmed by the patient and me. Plan: The risks, benefits, expected outcome, and alternative to the recommended procedure have been discussed with the patient. Patient understands and wants to proceed with the procedure.      Electronically signed by Salma Tong MD on 3/2/2021 at 10:43 AM

## 2021-03-02 NOTE — PROGRESS NOTES
Report from Vika Champion, [facility]      Patient tolerated procedure well. Report called to Annie Chapman RN, Specimen taken to the lab for testing.   #[id number]

## 2021-03-02 NOTE — DISCHARGE SUMMARY
Physician Discharge Summary     Patient ID:  Cheyenne Barnett  [id number]  77 y.o.  1945    Admit date: 2/26/2021    Discharge date and time:  3/2/2021     Admission Diagnoses:   Chief Complaint   Patient presents with    Altered Mental Status     family states she has been acting different X one month. alert to self in place in triage    Urinary Tract Infection     has abx but has not started yet      UTI (urinary tract infection)     Discharge Diagnoses:   Principal Problem:    UTI (urinary tract infection)  Active Problems:    AMS (altered mental status)    Hypothyroidism    Liver cirrhosis ([facility] [address]    Diabetes mellitus ([address]    SHERRIE (acute kidney injury) ([facility] [address]    Hepatic encephalopathy (HCC)    Abnormal EKG    Ascites    Mitral stenosis    Mitral regurgitation    Left ventricular outflow tract obstruction  Resolved Problems:    * No resolved hospital problems. *       Consults: Gastroenterology    Procedures: Paracentesis with 4.5 L removed    Hospital Course:   Patient presented with mild confusion. She has a history of cirrhosis. She was found to have UTI as well as a mild, gradual decompensation in her cirrhosis. She was also noted to be short of breath with very large ascites. She was started on ceftriaxone but eventually her culture came back showing Enterococcus so she was transitioned to amoxicillin. She was found to have elevated ammonia level although not obviously in hepatic encephalopathy. She was started on low-dose of lactulose. She underwent therapeutic and diagnostic paracentesis. GI was consulted. They ordered some diagnostic testing (cell count, albumin, etc) because apparently she had not had some of this done in the past.  Her cell count on the paracentesis is unremarkable, and her SAAG is consistent with portal hypertension    She has a very harsh systolic ejection murmur. We did not have an echo on file.   I obtained an echocardiogram which shows multiple valvular abnormalities but most strikingly an LVOT mean gradient of 32 mmHg at rest.  The patient and her daughter tell me she does follow regularly with a cardiologist near her home, Dr. Pool Oconnell, although they have not seen him in over a year. Since this is strictly an incidental finding and not related to her inpatient issues, I just recommended that they follow-up closely with her cardiologist upon discharge, ideally within the next month, to make sure this issue continues to get addressed appropriately. Discharge Exam:  Vitals:    03/02/21 1030 03/02/21 1130 03/02/21 1200 03/02/21 1230   BP: 138/76 136/68 116/67 106/60   Pulse: 76 71 74 80   Resp: 18 20 20 18   Temp: 98.5 °F (36.9 °C)   97 °F (36.1 °C)   TempSrc: Temporal   Temporal   SpO2: 95% 95% 98% 98%   Weight:       Height:            General: Pleasant female in no acute distress  Cardiac: Regular rate and rhythm, harsh 3 out of 6 systolic ejection murmur  Lungs: Clear bilaterally, work of breathing normal  Abdomen: Present bowel sounds soft nontender nondistended without rebound or guarding, much softer and less protuberant than before, with left lower quadrant paracentesis site clean/dry/intact with a Tegaderm over  Extremities: No lower extremity edema  Neuro: Awake, alert, conversant, logical     Condition:  Stable    Disposition: home    Patient Instructions:   Current Discharge Medication List      START taking these medications    Details   lactulose (CHRONULAC) 10 GM/15ML solution Take 30 mLs by mouth 2 times daily Can adjust dose to maintain 2-3 soft bowel movements daily  Qty: 946 mL, Refills: 3      amoxicillin (AMOXIL) 500 MG capsule Take 1 capsule by mouth every 8 hours for 5 days  Qty: 15 capsule, Refills: 0         CONTINUE these medications which have CHANGED    Details   gabapentin (NEURONTIN) 300 MG capsule Take 1 capsule by mouth nightly for 3 days.   Qty: 90 capsule, Refills: 3         CONTINUE these medications which have NOT CHANGED Details   b complex-C-folic acid (NEPHROCAPS) 1 MG capsule Take 1 capsule by mouth daily      loratadine (CLARITIN) 10 MG tablet Take 10 mg by mouth daily      metFORMIN (GLUCOPHAGE) 500 MG tablet Take 500 mg by mouth 2 times daily (with meals)      Ferrous Sulfate (IRON PO) Take 65 mg by mouth daily       pantoprazole (PROTONIX) 40 MG tablet Take 40 mg by mouth daily      magnesium (MAGNESIUM-OXIDE) 250 MG TABS tablet Take 250 mg by mouth daily      escitalopram (LEXAPRO) 10 MG tablet Take 10 mg by mouth daily      vitamin E 400 UNIT capsule Take 400 Units by mouth daily      calcium carbonate 600 MG TABS tablet Take 1 tablet by mouth daily      vitamin D (CHOLECALCIFEROL) 1000 UNIT TABS tablet Take 1,000 Units by mouth daily      levothyroxine (SYNTHROID) 50 MCG tablet Take 75 mcg by mouth Daily       simvastatin (ZOCOR) 20 MG tablet Take 20 mg by mouth nightly         STOP taking these medications       spironolactone (ALDACTONE) 50 MG tablet Comments:   Reason for Stopping:             Activity: activity as tolerated  Diet: diabetic diet    Follow-up with PCP in 1 week.     Note that over 30 minutes was spent in preparing discharge papers, discussing discharge with patient, medication review, etc.    Signed:  Jesús Sorensen    3/2/2021  2:30 PM

## 2021-03-02 NOTE — PLAN OF CARE
Problem: Falls - Risk of:  Goal: Will remain free from falls  Description: Will remain free from falls  3/2/2021 1010 by Joyce Camp RN  Outcome: Met This Shift

## 2021-03-02 NOTE — CARE COORDINATION
Social Work Discharge/Planning:    SW met with patient for follow up transition of care discussion. Patient reports plan remains to return home once medically clear for discharge. SW discussed [address] with patient, patient agreeable to [address]. Patient choiced [facility]. Nuria Aguilar made referral with liaison Serafin Salmon with [facility]. Patient reports family is able to transport her home upon discharge. Gait team is ordered for patient. GARY/MATY to follow for any needs that may arise upon discharge.     Britt Strickland, YONAS  [phone]

## 2021-03-16 NOTE — ED PROVIDER NOTES
surgical history that includes Appendectomy; Cholecystectomy; Hysterectomy; Ovary removal; Tonsillectomy; Colonoscopy; Endoscopy, colon, diagnostic; Upper gastrointestinal endoscopy (N/A, 11/4/2020); Upper gastrointestinal endoscopy (11/4/2020); and Upper gastrointestinal endoscopy (11/4/2020). Social History:  reports that she has never smoked. She has never used smokeless tobacco. She reports that she does not drink alcohol or use drugs. Family History: family history includes Cancer in her brother. . Unless otherwise noted, family history is non contributory    The patients home medications have been reviewed. Allergies: Other and Peanuts [peanut oil]    I have reviewed the past medical history, past surgical history, social history, and family history    ---------------------------------------------------PHYSICAL EXAM--------------------------------------    Constitutional/General: Alert and oriented x1  Head: Normocephalic and atraumatic  Eyes: EOMI, sclera non icteric. Subconjunctival hemorrhage of left eye with bilateral periorbital ecchymosis, left worse than right. Mouth: Dry MM. Oropharynx clear, handling secretions, no trismus, no asymmetry of the posterior oropharynx or uvular edema  Neck: Supple, full ROM, no stridor, no meningeal signs  Respiratory: Diminished breath sounds bilaterally. Cardiovascular:  Regular rate. Regular rhythm. No murmurs, no aortic murmurs, no gallops, or rubs. Chest: No chest wall tenderness  Gastrointestinal:  Abdomen Soft, Non tender, Non distended. No rebound, guarding, or rigidity. No pulsatile masses. Musculoskeletal: Moves all extremities x 4. Warm and well perfused, no clubbing, no cyanosis, no edema. Capillary refill <3 seconds  Skin: skin warm and dry. No rashes. Small area of ecchymosis over the right posterior proximal humerus. Full ROM of the arm. Non-tender to palpation. Neurologic: Spontaneously moving all extremities. No focal deficits. Psychiatric: Normal Affect          -------------------------------------------------- RESULTS -------------------------------------------------  I have personally reviewed all laboratory and imaging results for this patient. Results are listed below.      LABS: (Lab results interpreted by me)  Results for orders placed or performed during the hospital encounter of 03/16/21   CBC Auto Differential   Result Value Ref Range    WBC 9.6 4.5 - 11.5 E9/L    RBC 2.69 (L) 3.50 - 5.50 E12/L    Hemoglobin 9.3 (L) 11.5 - 15.5 g/dL    Hematocrit 30.5 (L) 34.0 - 48.0 %    .4 (H) 80.0 - 99.9 fL    MCH 34.6 26.0 - 35.0 pg    MCHC 30.5 (L) 32.0 - 34.5 %    RDW 17.6 (H) 11.5 - 15.0 fL    Platelets 353 770 - 533 E9/L    MPV 10.8 7.0 - 12.0 fL   Comprehensive Metabolic Panel w/ Reflex to MG   Result Value Ref Range    Sodium 134 132 - 146 mmol/L    Potassium reflex Magnesium 6.4 (H) 3.5 - 5.0 mmol/L    Chloride 98 98 - 107 mmol/L    CO2 7 (LL) 22 - 29 mmol/L    Anion Gap 29 (H) 7 - 16 mmol/L    Glucose 92 74 - 99 mg/dL    BUN 95 (H) 8 - 23 mg/dL    CREATININE 9.1 (HH) 0.5 - 1.0 mg/dL    GFR Non-African American 4 >=60 mL/min/1.73    GFR African American 5     Calcium 9.5 8.6 - 10.2 mg/dL    Total Protein 8.2 6.4 - 8.3 g/dL    Albumin 3.4 (L) 3.5 - 5.2 g/dL    Total Bilirubin 4.6 (H) 0.0 - 1.2 mg/dL    Alkaline Phosphatase 435 (H) 35 - 104 U/L    ALT 42 (H) 0 - 32 U/L    AST 90 (H) 0 - 31 U/L   Troponin   Result Value Ref Range    Troponin 0.04 (H) 0.00 - 0.03 ng/mL   Protime-INR   Result Value Ref Range    Protime 21.8 (H) 9.3 - 12.4 sec    INR 2.0    APTT   Result Value Ref Range    aPTT 27.1 24.5 - 35.1 sec   ,       RADIOLOGY:  Interpreted by Radiologist unless otherwise specified  XR CHEST PORTABLE   Final Result   Findings suggest early/mild CHF         CT Head WO Contrast    (Results Pending)   CT Cervical Spine WO Contrast    (Results Pending)   CT CHEST WO CONTRAST    (Results Pending)   CT ABDOMEN WO CONTRAST Additional Contrast? None    (Results Pending)         EKG Interpretation  Interpreted by Patricio Padilla    Date of EKG: 3/16/21  Time: 1405    Rhythm: normal sinus   Rate: normal  Axis: normal  Conduction: normal  ST Segments: nonspecific changes  T Waves: non specific changes    Clinical Impression: Normal sinus rhythm with sinus arrhythmia, normal axis, anterior lateral nonspecific ST changes, intervals within normal limits, QTC is 480  Comparison to prior EKG: stable as compared to patient's most recent EKG      ------------------------- NURSING NOTES AND VITALS REVIEWED ---------------------------   The nursing notes within the ED encounter and vital signs as below have been reviewed by myself  /74   Pulse 79   Temp 96.9 °F (36.1 °C)   Resp 20   Ht 5' 5\" (1.651 m)   Wt 165 lb (74.8 kg)   SpO2 96%   BMI 27.46 kg/m²     Oxygen Saturation Interpretation: 96 % on room air. Normal    The patients available past medical records and past encounters were reviewed.         ------------------------------ ED COURSE/MEDICAL DECISION MAKING----------------------  Medications   0.9 % sodium chloride infusion (1,000 mLs Intravenous New Bag 3/16/21 1358)   sodium bicarbonate 8.4 % injection 50 mEq (has no administration in time range)   calcium gluconate 1000 mg in dextrose 5% 100 mL IVPB (has no administration in time range)   insulin regular (HUMULIN R;NOVOLIN R) injection 10 Units (has no administration in time range)     And   dextrose 50 % IV solution (has no administration in time range)   glucose (GLUTOSE) 40 % oral gel 15 g (has no administration in time range)   dextrose 50 % IV solution (has no administration in time range)   glucagon (rDNA) injection 1 mg (has no administration in time range)   dextrose 5 % solution (has no administration in time range)   sodium bicarbonate 150 mEq in dextrose 5 % 1,000 mL infusion (has no administration in time range)   fentaNYL (SUBLIMAZE) injection 50 mcg (50 mcg Intravenous Given 3/16/21 2192)   ondansetron (ZOFRAN) injection 4 mg (4 mg Intravenous Given 3/16/21 5948)           The cardiac monitor revealed NSR with a heart rate in the 60s as interpreted by me. The cardiac monitor was ordered secondary to the patient's renal failure and to monitor the patient for dysrhythmia. CPT 13457           Medical Decision Making:     The patient was seen and evaluated by the Attending Emergency Medicine Physician Dr. Gladis Almaraz. The patient is a 77year-old-female who presents to the Emergency Department for a fall and altered mental status. She is hemodynamically stable on arrival and alert to person but otherwise disoriented. Daughter states that is her baseline mental status due to her dementia. Patient was found to be in acute renal failure with a creatinine of 9.1 along with hyperkalemia with a potassium of 6.4. Nephrology recommends starting a Bicarb drip. She was also treated with Calcium Gluconate, Insulin/Dextrose, and Sodium Bicarb for the hyperkalemia. She also had a high anion gap metabolic acidosis with the SHERRIE. CT scan evident of a subdural hematoma. I did consult with neurosurgery and trauma resident came to evaluate the patient. Patient began to decline and was struggling to breath. I discussed bipap with the patient's daughter. Nephrology also discussed dialysis. However, after multiple long in depth discussions with the patient's daughter she states that her mother would not want any of this. She states she has a living will that states no intubation, no chest compressions, or life saving heroic measures per her wishes. Patient has multi-system organ failure and poor prognosis. This was discussed with daughter, who agreed comfort measures are best for the patient at this point. Trauma consult cancelled, declined dialysis, no bipap. Patient was placed on a non-rebreather and hospice/palliative was consulted. Daughter agreed with plan.            Re-Evaluations:  ED Course as of Mar 16 2331   Tue Mar 16, 2021   1737 Consulted with Dr. Shaan Mitchell, neurosurgery, who will see the patient. Trauma resident consulted. [KG]   46 Consulted with Dr. Orquidea St, hospitalist, who accepted the patient for admission. [KG]      ED Course User Index  [KG] Merlinda Saunas, DO       This patient's ED course included: a personal history and physicial examination, re-evaluation prior to disposition, multiple bedside re-evaluations, IV medications, cardiac monitoring, continuous pulse oximetry and complex medical decision making and emergency management    This patient has remained hemodynamically stable during their ED course. Consultations:  Consulted with Dr. Shaan Mitchell, Dr. Zohreh Miranda, Dr. Orquidea St, surgical residents for trauma consult and vascular access for dialysis catheter. Counseling: The emergency provider has spoken with the family member patient and daughter and discussed todays results, in addition to providing specific details for the plan of care and counseling regarding the diagnosis and prognosis. Questions are answered at this time and they are agreeable with the plan.       --------------------------------- IMPRESSION AND DISPOSITION ---------------------------------    IMPRESSION  1. Acute renal failure, unspecified acute renal failure type ([facility] [address]    2. Hyperkalemia  3. Subdural Hematoma  4. Liver cirrhosis   5. High anion gap metabolic acidosis   6. Fall     DISPOSITION  Disposition: Admit to telemetry  Patient condition is critical        NOTE: This report was transcribed using voice recognition software. Every effort was made to ensure accuracy; however, inadvertent computerized transcription errors may be present      Please note that the withdrawal or failure to initiate urgent interventions for this patient would likely result in a life threatening deterioration or permanent disability.      Accordingly this patient received 35 minutes of critical care time, excluding separately billable procedures.        Nona Andrea DO  Resident  03/16/21 6373

## 2021-03-16 NOTE — CONSULTS
Reason for Consult:  SHERRIE, hyperkalemia      History of Present Ilness: The patient is a 77 yr. Old female with a PMH of anemia, DM, heart murmur, hypothyroidism, cirrhosis, ascites and sleep apnea. She presented to the ED today with chief complaints of AMS and fall. Her daughter is present and provides the details. The patient lives with her and she heard her fall. She reports that her mother has had intermittent confusion for the last month and her intake of food and fluids has been very poor since discharge on 3/2. The patient is taking lactulose which gives her diarrhea. She has had no vomiting. Initial labs showed the patient was in SHERRIE with a cr of 9.1, BUN 95, K+ 6.4, HAG acidosis with CO2 of 7. Bilirubin elevated at 4.6,AST 90, ALT42. She was treated for hyperkalemia with Ca gluconate, insulin and bicarbonate. A bicarbonate gtt was initiated. A washington catheter was placed for accurate outputs. She was recently in the hospital 2/27-3/2 for AMS with UTI and elevated ammonia. She had a paracentesis with  4500 ml removed on 3/2. During this previous admission the patient's cr was 1.7 on admission and down to 1.4 at discharge. Her baseline cr was 1.1-1.3 in Nov/Dec 2020. Upon assessment the patient opens eyes but is unable to answer questions. Her resp. are labored, lung sounds diminished. She has a ascites with a distended abd. No u/o from washington catheter.         Past Medical History:      Past Medical History:   Diagnosis Date    Anemia     Diabetes mellitus ([address]     Heart murmur     Hypothyroidism     Liver cirrhosis (HCC)     Sleep apnea     wears CPAP       Past Surgical History:     Past Surgical History:   Procedure Laterality Date    APPENDECTOMY      CHOLECYSTECTOMY      COLONOSCOPY      ENDOSCOPY, COLON, DIAGNOSTIC      HYSTERECTOMY      OVARY REMOVAL      TONSILLECTOMY      UPPER GASTROINTESTINAL ENDOSCOPY N/A 11/4/2020    ANTROGRADE BALLOON ENTEROSCOPY WITH C-ARM AND TATOO performed by Saeid Harris MD at [facility]  11/4/2020    ENTEROSCOPY PUSH CONTROL HEMORRHAGE APC AT 30 performed by Saeid Harris MD at [facility]  11/4/2020    ENTEROSCOPY FLUORO performed by Saeid Harris MD at [address] [facility] [address] History:      Social History     Socioeconomic History    Marital status:      Spouse name: Not on file    Number of children: Not on file    Years of education: Not on file    Highest education level: Not on file   Occupational History    Not on file   Social Needs    Financial resource strain: Not on file    Food insecurity     Worry: Not on file     Inability: Not on file    Transportation needs     Medical: Not on file     Non-medical: Not on file   Tobacco Use    Smoking status: Never Smoker    Smokeless tobacco: Never Used   Substance and Sexual Activity    Alcohol use: No    Drug use: No    Sexual activity: Not on file   Lifestyle    Physical activity     Days per week: Not on file     Minutes per session: Not on file    Stress: Not on file   Relationships    Social connections     Talks on phone: Not on file     Gets together: Not on file     Attends Congregation service: Not on file     Active member of club or organization: Not on file     Attends meetings of clubs or organizations: Not on file     Relationship status: Not on file    Intimate partner violence     Fear of current or ex partner: Not on file     Emotionally abused: Not on file     Physically abused: Not on file     Forced sexual activity: Not on file   Other Topics Concern    Not on file   Social History Narrative    Not on file       Family History:     Family History   Problem Relation Age of Onset    Cancer Brother        Current Medications:        Current Facility-Administered Medications:     0.9 % sodium chloride infusion, 1,000 mL, Intravenous, Continuous, Jamey Hernandez MD, Last Rate: 125 mL/hr at 03/16/21 1358, 1,000 mL at 03/16/21 1358    sodium bicarbonate 8.4 % injection 50 mEq, 50 mEq, Intravenous, Once, Sury Fremont, DO    calcium gluconate 1000 mg in dextrose 5% 100 mL IVPB, 1,000 mg, Intravenous, Once, Sury Fremont, DO    insulin regular (HUMULIN R;NOVOLIN R) injection 10 Units, 10 Units, Intravenous, Once **AND** dextrose 50 % IV solution, 25 g, Intravenous, Once **AND** POCT Glucose, , , Q30 Min **AND** POCT Glucose, , , Q1H **AND** POCT Glucose, , , 4x Daily AC & HS, Marilyn Carmona, DO    glucose (GLUTOSE) 40 % oral gel 15 g, 15 g, Oral, PRN, Sury Fremont, DO    dextrose 50 % IV solution, 12.5 g, Intravenous, PRN, Sury Fremont, DO    glucagon (rDNA) injection 1 mg, 1 mg, Intramuscular, PRN, Sury Fremont, DO    dextrose 5 % solution, 100 mL/hr, Intravenous, PRN, Sury Fremont, DO    sodium bicarbonate 150 mEq in dextrose 5 % 1,000 mL infusion, 150 mEq, Intravenous, Continuous, Sury Fremont, DO    Current Outpatient Medications:     gabapentin (NEURONTIN) 300 MG capsule, Take 1 capsule by mouth nightly for 3 days. , Disp: 90 capsule, Rfl: 3    lactulose (CHRONULAC) 10 GM/15ML solution, Take 30 mLs by mouth 2 times daily Can adjust dose to maintain 2-3 soft bowel movements daily, Disp: 946 mL, Rfl: 3    b complex-C-folic acid (NEPHROCAPS) 1 MG capsule, Take 1 capsule by mouth daily, Disp: , Rfl:     loratadine (CLARITIN) 10 MG tablet, Take 10 mg by mouth daily, Disp: , Rfl:     metFORMIN (GLUCOPHAGE) 500 MG tablet, Take 500 mg by mouth 2 times daily (with meals), Disp: , Rfl:     Ferrous Sulfate (IRON PO), Take 65 mg by mouth daily , Disp: , Rfl:     pantoprazole (PROTONIX) 40 MG tablet, Take 40 mg by mouth daily, Disp: , Rfl:     magnesium (MAGNESIUM-OXIDE) 250 MG TABS tablet, Take 250 mg by mouth daily, Disp: , Rfl:     escitalopram (LEXAPRO) 10 MG tablet, Take 10 mg by mouth daily, Disp: , Rfl:     vitamin E 400 UNIT capsule, Take 400 Units by mouth daily, Disp: , Rfl:     calcium carbonate 600 MG TABS tablet, Take 1 tablet by mouth daily, Disp: , Rfl:     vitamin D (CHOLECALCIFEROL) 1000 UNIT TABS tablet, Take 1,000 Units by mouth daily, Disp: , Rfl:     levothyroxine (SYNTHROID) 50 MCG tablet, Take 75 mcg by mouth Daily , Disp: , Rfl:     simvastatin (ZOCOR) 20 MG tablet, Take 20 mg by mouth nightly, Disp: , Rfl:     Allergies: Other and Peanuts [peanut oil]    Review of Systems:   Pertinent positives stated above in HPI. All other systems were reviewed and were negative. Physical exam:   No intake/output data recorded.      Constitutional:  /74   Pulse 79   Temp 96.9 °F (36.1 °C)   Resp 20   Ht 5' 5\" (1.651 m)   Wt 165 lb (74.8 kg)   SpO2 96%   BMI 27.46 kg/m²   Gen: lethargic  Skin: no rash, turgor wnl  Neck: no jvd noted  Cardiovascular:  RRR, murmur present  Respiratory: Lungs diminished  Abdomen:  +bs, distended, non tender  Ext: neg. lower extremity edema  Psychiatric: lethargic      Data:   CBC with Differential:    Lab Results   Component Value Date    WBC 9.6 03/16/2021    RBC 2.69 03/16/2021    HGB 9.3 03/16/2021    HCT 30.5 03/16/2021     03/16/2021    .4 03/16/2021    MCH 34.6 03/16/2021    MCHC 30.5 03/16/2021    RDW 17.6 03/16/2021    LYMPHOPCT 12.1 03/01/2021    MONOPCT 8.6 03/01/2021    MYELOPCT 0.9 02/27/2021    BASOPCT 0.5 03/01/2021    MONOSABS 0.37 03/01/2021    LYMPHSABS 0.52 03/01/2021    EOSABS 0.13 03/01/2021    BASOSABS 0.02 03/01/2021     CMP:    Lab Results   Component Value Date     03/16/2021    K 6.4 03/16/2021    CL 98 03/16/2021    CO2 7 03/16/2021    BUN 95 03/16/2021    CREATININE 9.1 03/16/2021    GFRAA 5 03/16/2021    LABGLOM 4 03/16/2021    GLUCOSE 92 03/16/2021    PROT 8.2 03/16/2021    LABALBU 3.4 03/16/2021    CALCIUM 9.5 03/16/2021    BILITOT 4.6 03/16/2021    ALKPHOS 435 03/16/2021    AST 90 03/16/2021    ALT 42 03/16/2021     Magnesium:    Lab Results   Component Value Date    MG 1.8 02/26/2021     Phosphorus:  No results found for: PHOS  ABG:  No results found for: PH, PCO2, PO2, HCO3, BE, THGB, TCO2, O2SAT  HgBA1c:    Lab Results   Component Value Date    LABA1C 6.3 02/22/2021     AMYLASE:    Lab Results   Component Value Date    AMYLASE 41 02/26/2021     LIPASE:    Lab Results   Component Value Date    LIPASE 46 02/26/2021       Assessment/Plan    1. SHERRIE on CKD stage 3a d/t ATN sec to decreased renal perfusion in the setting of  poor intake  Baseline cr 1.1-1.3 mg/dL with eGFR 59-48 ml/min  Initial cr 9.1  Strict I&O  Hold metformen  Insert washington cath to monitor output  Check PRICILA, urine lytes, cr  Monitor bmp q 6 hrs  Vascular consult for temp HD line  Aggressive hydration  May initiate HD tonight if no urine output  Daughter gives consent for HD    2. HAG acidosis with SHERRIE  On bicarbonate gtt    3. Hyperkalemia  S/p ca gluconate, insulin, bicarbonate  Supplement as needed    4. Liver cirrhosis  Follows with Dr. Tigre Mckinney    5. Anemia of chronic disease  Check iron studies  Transfuse for hgb below 7.0    Thank you for the opportunity to participate ini the care of [facility]    Patient examined   Found on the floor unconscious , hypotensive not eating well for days   Underlying cirrhosis of liver with recurrent ascites     Labs now suggestive of SEHRRIE /Acidosis Kaelyn Fat / Anemia , abnormal liver functions     Initiated on bicarb drip , will repeat K , await renal function recovery   If no urine output may consider dialysis soon     Issues d/w water who does not want CPR or intubation but agrees   For dialysis     CT scan  Acute rt parito occipital hematoma     Issues d/w daughter at length again 530 pm , as per her mothers wish she does not want any aggressive measures including intubation CPR or dialysis -- will focus on comfort care   D/w er physician  Agree with plan     Dr Flores Sam

## 2021-03-16 NOTE — ED NOTES
Bed: HDD  Expected date:   Expected time:   Means of arrival:   Comments:  [place]/ Female     tSephany Navarro RN  03/16/21 7613
(1) Other Medications/None

## 2021-03-17 PROBLEM — E87.5 HYPERKALEMIA: Status: ACTIVE | Noted: 2021-01-01

## 2021-03-17 PROBLEM — I95.9 HYPOTENSION: Status: ACTIVE | Noted: 2021-01-01

## 2021-03-17 PROBLEM — S06.5XAA SUBDURAL HEMATOMA: Status: ACTIVE | Noted: 2021-01-01

## 2021-03-17 NOTE — PROGRESS NOTES
Reason for Consult:  SHERRIE, hyperkalemia      History of Present Ilness: The patient is a 77 yr. Old female with a PMH of anemia, DM, heart murmur, hypothyroidism, cirrhosis, ascites and sleep apnea. She presented to the ED today with chief complaints of AMS and fall. Her daughter is present and provides the details. The patient lives with her and she heard her fall. She reports that her mother has had intermittent confusion for the last month and her intake of food and fluids has been very poor since discharge on 3/2. The patient is taking lactulose which gives her diarrhea. She has had no vomiting. Initial labs showed the patient was in SHERRIE with a cr of 9.1, BUN 95, K+ 6.4, HAG acidosis with CO2 of 7. Bilirubin elevated at 4.6,AST 90, ALT42. She was treated for hyperkalemia with Ca gluconate, insulin and bicarbonate. A bicarbonate gtt was initiated. A washington catheter was placed for accurate outputs. She was recently in the hospital 2/27-3/2 for AMS with UTI and elevated ammonia. She had a paracentesis with  4500 ml removed on 3/2. During this previous admission the patient's cr was 1.7 on admission and down to 1.4 at discharge. Her baseline cr was 1.1-1.3 in Nov/Dec 2020. Upon assessment the patient opens eyes but is unable to answer questions. Her resp. are labored, lung sounds diminished. She has a ascites with a distended abd. No u/o from washington catheter. 3/17/21: Spoke with the patient's daughter and they want only comfort measures and are waiting to meet with Hospice.      Past Medical History:      Past Medical History:   Diagnosis Date    Anemia     Diabetes mellitus ([address]     Heart murmur     Hypothyroidism     Liver cirrhosis (HCC)     Sleep apnea     wears CPAP       Past Surgical History:     Past Surgical History:   Procedure Laterality Date    APPENDECTOMY      CHOLECYSTECTOMY      COLONOSCOPY      ENDOSCOPY, COLON, DIAGNOSTIC      HYSTERECTOMY      OVARY REMOVAL      TONSILLECTOMY  UPPER GASTROINTESTINAL ENDOSCOPY N/A 11/4/2020    ANTROGRADE BALLOON ENTEROSCOPY WITH C-ARM AND TATOO performed by Selin Reyes MD at [address] [facility] [address]  11/4/2020    ENTEROSCOPY PUSH CONTROL HEMORRHAGE APC AT 30 performed by Selin Reyes MD at [address] [address]  11/4/2020    ENTEROSCOPY FLUORO performed by Selin Reyes MD at [address] History:      Social History     Socioeconomic History    Marital status:      Spouse name: Not on file    Number of children: Not on file    Years of education: Not on file    Highest education level: Not on file   Occupational History    Not on file   Social Needs    Financial resource strain: Not on file    Food insecurity     Worry: Not on file     Inability: Not on file    Transportation needs     Medical: Not on file     Non-medical: Not on file   Tobacco Use    Smoking status: Never Smoker    Smokeless tobacco: Never Used   Substance and Sexual Activity    Alcohol use: No    Drug use: No    Sexual activity: Not on file   Lifestyle    Physical activity     Days per week: Not on file     Minutes per session: Not on file    Stress: Not on file   Relationships    Social connections     Talks on phone: Not on file     Gets together: Not on file     Attends Islam service: Not on file     Active member of club or organization: Not on file     Attends meetings of clubs or organizations: Not on file     Relationship status: Not on file    Intimate partner violence     Fear of current or ex partner: Not on file     Emotionally abused: Not on file     Physically abused: Not on file     Forced sexual activity: Not on file   Other Topics Concern    Not on file   Social History Narrative    Not on file       Family History:     Family History   Problem Relation Age of Onset    Cancer Brother        Current Medications:        Current Facility-Administered Medications:     glucose (GLUTOSE) 40 % oral gel 15 g, 15 g, Oral, PRN, Nona Fish, DO    dextrose 50 % IV solution, 12.5 g, Intravenous, PRN, Nona Fish, DO    glucagon (rDNA) injection 1 mg, 1 mg, Intramuscular, PRN, Nona Fish, DO    dextrose 5 % solution, 100 mL/hr, Intravenous, PRN, Nona Fish, DO    calcium elemental (OSCAL) tablet 500 mg, 500 mg, Oral, Daily, jB Carson MD    escitalopram (LEXAPRO) tablet 10 mg, 10 mg, Oral, Daily, Bj Carson MD    levothyroxine (SYNTHROID) tablet 75 mcg, 75 mcg, Oral, Daily, Bj Carson MD    pantoprazole (PROTONIX) tablet 40 mg, 40 mg, Oral, QAM AC, Bj Carson MD    sodium chloride flush 0.9 % injection 10 mL, 10 mL, Intravenous, 2 times per day, Bj Carson MD    sodium chloride flush 0.9 % injection 10 mL, 10 mL, Intravenous, PRN, Bj Carson MD    promethazine (PHENERGAN) tablet 12.5 mg, 12.5 mg, Oral, Q6H PRN **OR** ondansetron (ZOFRAN) injection 4 mg, 4 mg, Intravenous, Q6H PRN, Bj Carson MD    acetaminophen (TYLENOL) tablet 650 mg, 650 mg, Oral, Q6H PRN **OR** acetaminophen (TYLENOL) suppository 650 mg, 650 mg, Rectal, Q6H PRN, Bj Carson MD    LORazepam (ATIVAN) injection 1 mg, 1 mg, Intravenous, Q6H PRN, Gale Ora, DO    glycopyrrolate (ROBINUL) injection 0.2 mg, 0.2 mg, Intravenous, Q4H PRN, Gale Ora, DO, 0.2 mg at 03/17/21 0759    morphine (PF) injection 2 mg, 2 mg, Intravenous, Q2H PRN, 2 mg at 03/16/21 2151 **OR** morphine (PF) injection 4 mg, 4 mg, Intravenous, Q2H PRN, Gale Ora, DO, 4 mg at 03/17/21 0759    Allergies: Other and Peanuts [peanut oil]    Review of Systems:   Pertinent positives stated above in HPI. All other systems were reviewed and were negative. Physical exam:   No intake/output data recorded.      Constitutional:  BP (!) 56/35   Pulse 55   Temp 95.7 °F (35.4 °C) (Temporal)   Resp 8   Ht 5' 5\" (1.651 m)   Wt 165 lb (74.8 kg)   SpO2 97%   BMI 27.46 kg/m²   Gen: lethargic  Skin: no rash, turgor wnl  Neck: no jvd noted  Cardiovascular:  RRR, murmur present  Respiratory: Lungs diminished  Abdomen:  +bs, distended, non tender  Ext: neg. lower extremity edema  Psychiatric: lethargic      Data:   CBC with Differential:    Lab Results   Component Value Date    WBC 9.6 03/16/2021    RBC 2.69 03/16/2021    HGB 9.3 03/16/2021    HCT 30.5 03/16/2021     03/16/2021    .4 03/16/2021    MCH 34.6 03/16/2021    MCHC 30.5 03/16/2021    RDW 17.6 03/16/2021    NRBC 0.9 03/16/2021    LYMPHOPCT 2.6 03/16/2021    MONOPCT 0.9 03/16/2021    MYELOPCT 1.7 03/16/2021    BASOPCT 0.2 03/16/2021    MONOSABS 0.10 03/16/2021    LYMPHSABS 0.29 03/16/2021    EOSABS 0.25 03/16/2021    BASOSABS 0.00 03/16/2021     CMP:    Lab Results   Component Value Date     03/16/2021    K 6.4 03/16/2021    CL 98 03/16/2021    CO2 7 03/16/2021    BUN 95 03/16/2021    CREATININE 9.1 03/16/2021    GFRAA 5 03/16/2021    LABGLOM 4 03/16/2021    GLUCOSE 92 03/16/2021    PROT 8.2 03/16/2021    LABALBU 3.4 03/16/2021    CALCIUM 9.5 03/16/2021    BILITOT 4.6 03/16/2021    ALKPHOS 435 03/16/2021    AST 90 03/16/2021    ALT 42 03/16/2021     Magnesium:    Lab Results   Component Value Date    MG 1.8 02/26/2021     Phosphorus:  No results found for: PHOS  ABG:  No results found for: PH, PCO2, PO2, HCO3, BE, THGB, TCO2, O2SAT  HgBA1c:    Lab Results   Component Value Date    LABA1C 6.3 02/22/2021     AMYLASE:    Lab Results   Component Value Date    AMYLASE 41 02/26/2021     LIPASE:    Lab Results   Component Value Date    LIPASE 46 02/26/2021       Assessment/Plan    1.  SHERRIE on CKD stage 3a d/t ATN sec to decreased renal perfusion in the setting of  poor intake  Baseline cr 1.1-1.3 mg/dL with eGFR 59-48 ml/min  Initial cr 9.1  Strict I&O  Hold metformen  Insert washington cath to monitor output  Check PRICILA, urine lytes, cr  Monitor bmp q 6 hrs  Vascular consult for temp HD line  Aggressive

## 2021-03-17 NOTE — FLOWSHEET NOTE
Patient found to be without pulse, respirations, or blood pressure at 11am. Dr Tram Ricardo notified. Daughter, Vivi Record at bedside at time of death. [facility] notified and released the body, referral # [id number].

## 2021-03-17 NOTE — DISCHARGE SUMMARY
Physician Discharge Summary     Patient ID:  Kulwinder Albrecht  [id number]  77 y.o.  1945    Admit date: 3/16/2021    Discharge date and time:  3/17/2021     Admission Diagnoses:   Chief Complaint   Patient presents with   Kiko Marroquin     found on floor this morning by daughter next to bed. Ecchymosis noted around eyes. No blood thinners      SHERRIE (acute kidney injury) [facility])     Discharge Diagnoses:   Principal Problem:    SHERRIE (acute kidney injury) ([facility]  Active Problems:    Liver cirrhosis ([facility]    Diabetes mellitus ([facility]    Left ventricular outflow tract obstruction    Hyperkalemia    Hypotension    Subdural hematoma (HCC)  Resolved Problems:    * No resolved hospital problems. *       Consults: Nephrology, palliative care    Procedures: none    Hospital Course: This is a medically complex patient with chronically decompensated cirrhosis and very frail at baseline. She was found on the ground at home, and presumably fell out of bed. She was found to have a subdural hematoma and profound acute kidney injury. She was extremely hypotensive and encephalopathic. Her family has opted to make her comfortable. She  on the morning of 3/17.      Discharge Exam:        Condition:      Disposition: NA    Patient Instructions:   N/A    Activity: N/A  Diet: N/A    Signed:  Homa Silva    3/17/2021  11:05 AM

## 2021-03-17 NOTE — PROGRESS NOTES
Received call from bedside RN. Patient . Family coping appropriately. No needs.  Thank you for the referral.

## 2021-03-17 NOTE — PROGRESS NOTES
Referral received. Chart reviewed. Visit made to unit. Many family members at the bedside. Patient's daughter provided a brief explanation for reason for this admission. Patient is unresponsive. Heart rate 30. Respirations are shallow and 6. Patient having 30 seconds of apnea. 15L NRB mask on. No pulses to radial or pedal noted. Mottling noted to feet and hands. Explained end of life signs and patient is actively dying. Discussed with family that 15L of oxygen is not beneficial at this time. Family is in agreement to remove oxygen. Much emotional support and active listening provided. Updated Dr. Kelin Donald and bedside RN.

## 2021-03-17 NOTE — H&P
[address] [facility]  History and Physical      CHIEF COMPLAINT:    Chief Complaint   Patient presents with   Rios Fall     found on floor this morning by daughter next to bed. Ecchymosis noted around eyes. No blood thinners        Patient of Rajeev VyasJeremy  presents with:  SHERRIE (acute kidney injury) ([facility] [address]    History of Present Illness:   Patient is well-known to me. She was recently admitted to the hospital for confusion, UTI, decompensated cirrhosis. She is extremely extremely frail but did reasonably well during that admission. Reportedly since returning home her appetite has been very poor. One of her children found her on the ground next to her bed early this morning. In the ED she was minimally responsive and was found to have severe acute kidney injury with creatinine over 9. Nephrology was consulted and initially there were some plans to initiate dialysis but ultimately her family decided to make her comfortable. Currently she is totally unresponsive and basically having agonal breathing. She does appear comfortable though. She is of course unable to provide any history       REVIEW OF SYSTEMS:  Pertinent negatives are above in HPI. 10 point ROS otherwise negative.       Past Medical History:   Diagnosis Date    Anemia     Diabetes mellitus ([address]     Heart murmur     Hypothyroidism     Liver cirrhosis (HCC)     Sleep apnea     wears CPAP         Past Surgical History:   Procedure Laterality Date    APPENDECTOMY      CHOLECYSTECTOMY      COLONOSCOPY      ENDOSCOPY, COLON, DIAGNOSTIC      HYSTERECTOMY      OVARY REMOVAL      TONSILLECTOMY      UPPER GASTROINTESTINAL ENDOSCOPY N/A 11/4/2020    ANTROGRADE BALLOON ENTEROSCOPY WITH C-ARM AND TATOO performed by Saeid Harris MD at [address]  11/4/2020    ENTEROSCOPY PUSH CONTROL HEMORRHAGE APC AT 30 performed by Saeid Harris MD at [address]  11/4/2020 ENTEROSCOPY FLUORO performed by Sugar Law MD at [address]       Medications Prior to Admission:    Medications Prior to Admission: gabapentin (NEURONTIN) 300 MG capsule, Take 1 capsule by mouth nightly for 3 days. lactulose (CHRONULAC) 10 GM/15ML solution, Take 30 mLs by mouth 2 times daily Can adjust dose to maintain 2-3 soft bowel movements daily  b complex-C-folic acid (NEPHROCAPS) 1 MG capsule, Take 1 capsule by mouth daily  loratadine (CLARITIN) 10 MG tablet, Take 10 mg by mouth daily  metFORMIN (GLUCOPHAGE) 500 MG tablet, Take 500 mg by mouth 2 times daily (with meals)  Ferrous Sulfate (IRON PO), Take 65 mg by mouth daily   pantoprazole (PROTONIX) 40 MG tablet, Take 40 mg by mouth daily  magnesium (MAGNESIUM-OXIDE) 250 MG TABS tablet, Take 250 mg by mouth daily  escitalopram (LEXAPRO) 10 MG tablet, Take 10 mg by mouth daily  vitamin E 400 UNIT capsule, Take 400 Units by mouth daily  calcium carbonate 600 MG TABS tablet, Take 1 tablet by mouth daily  vitamin D (CHOLECALCIFEROL) 1000 UNIT TABS tablet, Take 1,000 Units by mouth daily  levothyroxine (SYNTHROID) 50 MCG tablet, Take 75 mcg by mouth Daily   simvastatin (ZOCOR) 20 MG tablet, Take 20 mg by mouth nightly    Note that the patient's home medications were reviewed and the above list is accurate to the best of my knowledge at the time of the exam.    Allergies: Other and Peanuts [peanut oil]    Social History:    reports that she has never smoked. She has never used smokeless tobacco. She reports that she does not drink alcohol or use drugs. Family History:   family history includes Cancer in her brother.       PHYSICAL EXAM:    Vitals:  BP 84/62   Pulse 59   Temp 96.1 °F (35.6 °C) (Temporal)   Resp 15   Ht 5' 5\" (1.651 m)   Wt 165 lb (74.8 kg)   SpO2 97%   BMI 27.46 kg/m²       General appearance: Frail and moribund appearing  Eyes: Sclerae anicteric  HEENT: Bilateral periorbital ecchymoses L>R, dry mucous membranes  Neck: FROM, supple, no may be reactive   but warrant continued attention on follow-up.       Moderate volume simple ascites. CT Chest:  Motion degraded evaluation of the ribs.  No displaced rib fracture given   limitation.  No acute fracture as described.       Aneurysmal dilatation of the ascending aorta measuring 4.5 cm.  Follow-up   recommended.       Diffuse interlobular septal thickening.  Correlate with presentation for   congestive heart failure.       0.8 cm nodular opacity in the left upper lobe.  Three-month follow-up   recommended. EKG:  I've personally reviewed the patient's EKG:  NSR no acute ischemic changes     ASSESSMENT/PLAN:  Principal Problem:    SHERRIE (acute kidney injury) ([facility] [address]  Active Problems:    Liver cirrhosis (HCC)    Diabetes mellitus ([facility] [address]    Left ventricular outflow tract obstruction    Hyperkalemia    Hypotension    Subdural hematoma (HCC)  Resolved Problems:    * No resolved hospital problems. *      New subdural hematoma after fall. Now in terrible renal failure and hypotensive, without even being on diuretics. Decompensated cirrhosis and possible hepatorenal syndrome    Suspect ATN with granular casts in urine. \"K protocol\" given in ED. Family wishes for her to be made comfortable. Hospice consult.      Code status: DNR/CC  Requires inpatient level of care  Rob Carpenter    7:54 AM  3/17/2021

## 2021-03-17 NOTE — DEATH NOTES
Death Pronouncement Note  Patient's Name: Viola Henderson   Patient's YOB: 1945  MRN Number: [id number]    Admitting Provider: Cheryl Mahan MD  Attending Provider: Cheryl Mahan MD    Patient was examined and the following were absent: pulse, chest rise, and breath/heart sounds    I declared the patient dead on  3/17/2021 at  11:04 AM     Preliminary Cause of Death:    Acute on chronic renal failure    Electronically signed by Cheryl Mahan MD on 3/17/21 at 11:04 AM EDT

## 2021-03-17 NOTE — PROGRESS NOTES
Physical Therapy  Name: Juliane Christian  Room: 3730/5618-S  Date: 03/17/21    PT consult received and appreciated. Per chart review, pt not appropriate for PT intervention and family wishes not in line with intervention. Please reconsult if needed.      Zayra Maynard, PT, DPT  [id number]

## 2021-07-08 NOTE — PLAN OF CARE
Oxybutynin Counseling:  I discussed with the patient the risks of oxybutynin including but not limited to skin rash, drowsiness, dry mouth, difficulty urinating, and blurred vision. Problem: Falls - Risk of:  Goal: Will remain free from falls  Description: Will remain free from falls  3/2/2021 1517 by Mic Wylie RN  Outcome: Completed  3/2/2021 1010 by Mic Wylie RN  Outcome: Met This Shift  Goal: Absence of physical injury  Description: Absence of physical injury  Outcome: Completed Siliq Pregnancy And Lactation Text: The risk during pregnancy and breastfeeding is uncertain with this medication. Topical Retinoid counseling:  Patient advised to apply a pea-sized amount only at bedtime and wait 30 minutes after washing their face before applying.  If too drying, patient may add a non-comedogenic moisturizer. The patient verbalized understanding of the proper use and possible adverse effects of retinoids.  All of the patient's questions and concerns were addressed. Picato Counseling:  I discussed with the patient the risks of Picato including but not limited to erythema, scaling, itching, weeping, crusting, and pain. Cyclophosphamide Pregnancy And Lactation Text: This medication is Pregnancy Category D and it isn't considered safe during pregnancy. This medication is excreted in breast milk. Albendazole Counseling:  I discussed with the patient the risks of albendazole including but not limited to cytopenia, kidney damage, nausea/vomiting and severe allergy.  The patient understands that this medication is being used in an off-label manner. Tazorac Counseling:  Patient advised that medication is irritating and drying.  Patient may need to apply sparingly and wash off after an hour before eventually leaving it on overnight.  The patient verbalized understanding of the proper use and possible adverse effects of tazorac.  All of the patient's questions and concerns were addressed. Thalidomide Pregnancy And Lactation Text: This medication is Pregnancy Category X and is absolutely contraindicated during pregnancy. It is unknown if it is excreted in breast milk. Doxycycline Pregnancy And Lactation Text: This medication is Pregnancy Category D and not consider safe during pregnancy. It is also excreted in breast milk but is considered safe for shorter treatment courses. Mirvaso Counseling: Mirvaso is a topical medication which can decrease superficial blood flow where applied. Side effects are uncommon and include stinging, redness and allergic reactions. Bexarotene Counseling:  I discussed with the patient the risks of bexarotene including but not limited to hair loss, dry lips/skin/eyes, liver abnormalities, hyperlipidemia, pancreatitis, depression/suicidal ideation, photosensitivity, drug rash/allergic reactions, hypothyroidism, anemia, leukopenia, infection, cataracts, and teratogenicity.  Patient understands that they will need regular blood tests to check lipid profile, liver function tests, white blood cell count, thyroid function tests and pregnancy test if applicable. Cephalexin Pregnancy And Lactation Text: This medication is Pregnancy Category B and considered safe during pregnancy.  It is also excreted in breast milk but can be used safely for shorter doses. Birth Control Pills Pregnancy And Lactation Text: This medication should be avoided if pregnant and for the first 30 days post-partum. Cyclosporine Pregnancy And Lactation Text: This medication is Pregnancy Category C and it isn't know if it is safe during pregnancy. This medication is excreted in breast milk. Spironolactone Counseling: Patient advised regarding risks of diarrhea, abdominal pain, hyperkalemia, birth defects (for female patients), liver toxicity and renal toxicity. The patient may need blood work to monitor liver and kidney function and potassium levels while on therapy. The patient verbalized understanding of the proper use and possible adverse effects of spironolactone.  All of the patient's questions and concerns were addressed. Valtrex Counseling: I discussed with the patient the risks of valacyclovir including but not limited to kidney damage, nausea, vomiting and severe allergy.  The patient understands that if the infection seems to be worsening or is not improving, they are to call. Opioid Pregnancy And Lactation Text: These medications can lead to premature delivery and should be avoided during pregnancy. These medications are also present in breast milk in small amounts. Doxepin Pregnancy And Lactation Text: This medication is Pregnancy Category C and it isn't known if it is safe during pregnancy. It is also excreted in breast milk and breast feeding isn't recommended. Minoxidil Pregnancy And Lactation Text: This medication has not been assigned a Pregnancy Risk Category but animal studies failed to show danger with the topical medication. It is unknown if the medication is excreted in breast milk. Propranolol Pregnancy And Lactation Text: This medication is Pregnancy Category C and it isn't known if it is safe during pregnancy. It is excreted in breast milk. Enbrel Pregnancy And Lactation Text: This medication is Pregnancy Category B and is considered safe during pregnancy. It is unknown if this medication is excreted in breast milk. Doxepin Counseling:  Patient advised that the medication is sedating and not to drive a car after taking this medication. Patient informed of potential adverse effects including but not limited to dry mouth, urinary retention, and blurry vision.  The patient verbalized understanding of the proper use and possible adverse effects of doxepin.  All of the patient's questions and concerns were addressed. Sski Pregnancy And Lactation Text: This medication is Pregnancy Category D and isn't considered safe during pregnancy. It is excreted in breast milk. Zyclara Counseling:  I discussed with the patient the risks of imiquimod including but not limited to erythema, scaling, itching, weeping, crusting, and pain.  Patient understands that the inflammatory response to imiquimod is variable from person to person and was educated regarded proper titration schedule.  If flu-like symptoms develop, patient knows to discontinue the medication and contact us. Elidel Pregnancy And Lactation Text: This medication is Pregnancy Category C. It is unknown if this medication is excreted in breast milk. Otezla Pregnancy And Lactation Text: This medication is Pregnancy Category C and it isn't known if it is safe during pregnancy. It is unknown if it is excreted in breast milk. Xelchristyz Pregnancy And Lactation Text: This medication is Pregnancy Category D and is not considered safe during pregnancy.  The risk during breast feeding is also uncertain. Topical Sulfur Applications Pregnancy And Lactation Text: This medication is Pregnancy Category C and has an unknown safety profile during pregnancy. It is unknown if this topical medication is excreted in breast milk. Rifampin Pregnancy And Lactation Text: This medication is Pregnancy Category C and it isn't know if it is safe during pregnancy. It is also excreted in breast milk and should not be used if you are breast feeding. Rifampin Counseling: I discussed with the patient the risks of rifampin including but not limited to liver damage, kidney damage, red-orange body fluids, nausea/vomiting and severe allergy. Metronidazole Counseling:  I discussed with the patient the risks of metronidazole including but not limited to seizures, nausea/vomiting, a metallic taste in the mouth, nausea/vomiting and severe allergy. SSKI Counseling:  I discussed with the patient the risks of SSKI including but not limited to thyroid abnormalities, metallic taste, GI upset, fever, headache, acne, arthralgias, paraesthesias, lymphadenopathy, easy bleeding, arrhythmias, and allergic reaction. Xeljanz Counseling: I discussed with the patient the risks of Xeljanz therapy including increased risk of infection, liver issues, headache, diarrhea, or cold symptoms. Live vaccines should be avoided. They were instructed to call if they have any problems. Ivermectin Pregnancy And Lactation Text: This medication is Pregnancy Category C and it isn't known if it is safe during pregnancy. It is also excreted in breast milk. Doxycycline Counseling:  Patient counseled regarding possible photosensitivity and increased risk for sunburn.  Patient instructed to avoid sunlight, if possible.  When exposed to sunlight, patients should wear protective clothing, sunglasses, and sunscreen.  The patient was instructed to call the office immediately if the following severe adverse effects occur:  hearing changes, easy bruising/bleeding, severe headache, or vision changes.  The patient verbalized understanding of the proper use and possible adverse effects of doxycycline.  All of the patient's questions and concerns were addressed. Benzoyl Peroxide Pregnancy And Lactation Text: This medication is Pregnancy Category C. It is unknown if benzoyl peroxide is excreted in breast milk. Xolair Counseling:  Patient informed of potential adverse effects including but not limited to fever, muscle aches, rash and allergic reactions.  The patient verbalized understanding of the proper use and possible adverse effects of Xolair.  All of the patient's questions and concerns were addressed. Solaraze Counseling:  I discussed with the patient the risks of Solaraze including but not limited to erythema, scaling, itching, weeping, crusting, and pain. Sarecycline Counseling: Patient advised regarding possible photosensitivity and discoloration of the teeth, skin, lips, tongue and gums.  Patient instructed to avoid sunlight, if possible.  When exposed to sunlight, patients should wear protective clothing, sunglasses, and sunscreen.  The patient was instructed to call the office immediately if the following severe adverse effects occur:  hearing changes, easy bruising/bleeding, severe headache, or vision changes.  The patient verbalized understanding of the proper use and possible adverse effects of sarecycline.  All of the patient's questions and concerns were addressed. Cellcept Pregnancy And Lactation Text: This medication is Pregnancy Category D and isn't considered safe during pregnancy. It is unknown if this medication is excreted in breast milk. Erivedge Counseling- I discussed with the patient the risks of Erivedge including but not limited to nausea, vomiting, diarrhea, constipation, weight loss, changes in the sense of taste, decreased appetite, muscle spasms, and hair loss.  The patient verbalized understanding of the proper use and possible adverse effects of Erivedge.  All of the patient's questions and concerns were addressed. Skyrizi Counseling: I discussed with the patient the risks of risankizumab-rzaa including but not limited to immunosuppression, and serious infections.  The patient understands that monitoring is required including a PPD at baseline and must alert us or the primary physician if symptoms of infection or other concerning signs are noted. Infliximab Counseling:  I discussed with the patient the risks of infliximab including but not limited to myelosuppression, immunosuppression, autoimmune hepatitis, demyelinating diseases, lymphoma, and serious infections.  The patient understands that monitoring is required including a PPD at baseline and must alert us or the primary physician if symptoms of infection or other concerning signs are noted. Oxybutynin Pregnancy And Lactation Text: This medication is Pregnancy Category B and is considered safe during pregnancy. It is unknown if it is excreted in breast milk. Cyclophosphamide Counseling:  I discussed with the patient the risks of cyclophosphamide including but not limited to hair loss, hormonal abnormalities, decreased fertility, abdominal pain, diarrhea, nausea and vomiting, bone marrow suppression and infection. The patient understands that monitoring is required while taking this medication. Dupixent Counseling: I discussed with the patient the risks of dupilumab including but not limited to eye infection and irritation, cold sores, injection site reactions, worsening of asthma, allergic reactions and increased risk of parasitic infection.  Live vaccines should be avoided while taking dupilumab. Dupilumab will also interact with certain medications such as warfarin and cyclosporine. The patient understands that monitoring is required and they must alert us or the primary physician if symptoms of infection or other concerning signs are noted. Azithromycin Counseling:  I discussed with the patient the risks of azithromycin including but not limited to GI upset, allergic reaction, drug rash, diarrhea, and yeast infections. Minoxidil Counseling: Minoxidil is a topical medication which can increase blood flow where it is applied. It is uncertain how this medication increases hair growth. Side effects are uncommon and include stinging and allergic reactions. 5-Fu Pregnancy And Lactation Text: This medication is Pregnancy Category X and contraindicated in pregnancy and in women who may become pregnant. It is unknown if this medication is excreted in breast milk. Azathioprine Counseling:  I discussed with the patient the risks of azathioprine including but not limited to myelosuppression, immunosuppression, hepatotoxicity, lymphoma, and infections.  The patient understands that monitoring is required including baseline LFTs, Creatinine, possible TPMP genotyping and weekly CBCs for the first month and then every 2 weeks thereafter.  The patient verbalized understanding of the proper use and possible adverse effects of azathioprine.  All of the patient's questions and concerns were addressed. Use Enhanced Medication Counseling?: No Isotretinoin Pregnancy And Lactation Text: This medication is Pregnancy Category X and is considered extremely dangerous during pregnancy. It is unknown if it is excreted in breast milk. Simponi Counseling:  I discussed with the patient the risks of golimumab including but not limited to myelosuppression, immunosuppression, autoimmune hepatitis, demyelinating diseases, lymphoma, and serious infections.  The patient understands that monitoring is required including a PPD at baseline and must alert us or the primary physician if symptoms of infection or other concerning signs are noted. Terbinafine Pregnancy And Lactation Text: This medication is Pregnancy Category B and is considered safe during pregnancy. It is also excreted in breast milk and breast feeding isn't recommended. Humira Counseling:  I discussed with the patient the risks of adalimumab including but not limited to myelosuppression, immunosuppression, autoimmune hepatitis, demyelinating diseases, lymphoma, and serious infections.  The patient understands that monitoring is required including a PPD at baseline and must alert us or the primary physician if symptoms of infection or other concerning signs are noted. Colchicine Counseling:  Patient counseled regarding adverse effects including but not limited to stomach upset (nausea, vomiting, stomach pain, or diarrhea).  Patient instructed to limit alcohol consumption while taking this medication.  Colchicine may reduce blood counts especially with prolonged use.  The patient understands that monitoring of kidney function and blood counts may be required, especially at baseline. The patient verbalized understanding of the proper use and possible adverse effects of colchicine.  All of the patient's questions and concerns were addressed. High Dose Vitamin A Pregnancy And Lactation Text: High dose vitamin A therapy is contraindicated during pregnancy and breast feeding. Benzoyl Peroxide Counseling: Patient counseled that medicine may cause skin irritation and bleach clothing.  In the event of skin irritation, the patient was advised to reduce the amount of the drug applied or use it less frequently.   The patient verbalized understanding of the proper use and possible adverse effects of benzoyl peroxide.  All of the patient's questions and concerns were addressed. Cellcept Counseling:  I discussed with the patient the risks of mycophenolate mofetil including but not limited to infection/immunosuppression, GI upset, hypokalemia, hypercholesterolemia, bone marrow suppression, lymphoproliferative disorders, malignancy, GI ulceration/bleed/perforation, colitis, interstitial lung disease, kidney failure, progressive multifocal leukoencephalopathy, and birth defects.  The patient understands that monitoring is required including a baseline creatinine and regular CBC testing. In addition, patient must alert us immediately if symptoms of infection or other concerning signs are noted. Thalidomide Counseling: I discussed with the patient the risks of thalidomide including but not limited to birth defects, anxiety, weakness, chest pain, dizziness, cough and severe allergy. Protopic Pregnancy And Lactation Text: This medication is Pregnancy Category C. It is unknown if this medication is excreted in breast milk when applied topically. Calcipotriene Counseling:  I discussed with the patient the risks of calcipotriene including but not limited to erythema, scaling, itching, and irritation. Gabapentin Counseling: I discussed with the patient the risks of gabapentin including but not limited to dizziness, somnolence, fatigue and ataxia. Fluconazole Pregnancy And Lactation Text: This medication is Pregnancy Category C and it isn't know if it is safe during pregnancy. It is also excreted in breast milk. Imiquimod Counseling:  I discussed with the patient the risks of imiquimod including but not limited to erythema, scaling, itching, weeping, crusting, and pain.  Patient understands that the inflammatory response to imiquimod is variable from person to person and was educated regarded proper titration schedule.  If flu-like symptoms develop, patient knows to discontinue the medication and contact us. Rhofade Counseling: Rhofade is a topical medication which can decrease superficial blood flow where applied. Side effects are uncommon and include stinging, redness and allergic reactions. Tazorac Pregnancy And Lactation Text: This medication is not safe during pregnancy. It is unknown if this medication is excreted in breast milk. Metronidazole Pregnancy And Lactation Text: This medication is Pregnancy Category B and considered safe during pregnancy.  It is also excreted in breast milk. 5-Fu Counseling: 5-Fluorouracil Counseling:  I discussed with the patient the risks of 5-fluorouracil including but not limited to erythema, scaling, itching, weeping, crusting, and pain. Hydroxychloroquine Counseling:  I discussed with the patient that a baseline ophthalmologic exam is needed at the start of therapy and every year thereafter while on therapy. A CBC may also be warranted for monitoring.  The side effects of this medication were discussed with the patient, including but not limited to agranulocytosis, aplastic anemia, seizures, rashes, retinopathy, and liver toxicity. Patient instructed to call the office should any adverse effect occur.  The patient verbalized understanding of the proper use and possible adverse effects of Plaquenil.  All the patient's questions and concerns were addressed. Siliq Counseling:  I discussed with the patient the risks of Siliq including but not limited to new or worsening depression, suicidal thoughts and behavior, immunosuppression, malignancy, posterior leukoencephalopathy syndrome, and serious infections.  The patient understands that monitoring is required including a PPD at baseline and must alert us or the primary physician if symptoms of infection or other concerning signs are noted. There is also a special program designed to monitor depression which is required with Siliq. Azithromycin Pregnancy And Lactation Text: This medication is considered safe during pregnancy and is also secreted in breast milk. Cephalexin Counseling: I counseled the patient regarding use of cephalexin as an antibiotic for prophylactic and/or therapeutic purposes. Cephalexin (commonly prescribed under brand name Keflex) is a cephalosporin antibiotic which is active against numerous classes of bacteria, including most skin bacteria. Side effects may include nausea, diarrhea, gastrointestinal upset, rash, hives, yeast infections, and in rare cases, hepatitis, kidney disease, seizures, fever, confusion, neurologic symptoms, and others. Patients with severe allergies to penicillin medications are cautioned that there is about a 10% incidence of cross-reactivity with cephalosporins. When possible, patients with penicillin allergies should use alternatives to cephalosporins for antibiotic therapy. Colchicine Pregnancy And Lactation Text: This medication is Pregnancy Category C and isn't considered safe during pregnancy. It is excreted in breast milk. Bexarotene Pregnancy And Lactation Text: This medication is Pregnancy Category X and should not be given to women who are pregnant or may become pregnant. This medication should not be used if you are breast feeding. Hydroxyzine Pregnancy And Lactation Text: This medication is not safe during pregnancy and should not be taken. It is also excreted in breast milk and breast feeding isn't recommended. Acitretin Counseling:  I discussed with the patient the risks of acitretin including but not limited to hair loss, dry lips/skin/eyes, liver damage, hyperlipidemia, depression/suicidal ideation, photosensitivity.  Serious rare side effects can include but are not limited to pancreatitis, pseudotumor cerebri, bony changes, clot formation/stroke/heart attack.  Patient understands that alcohol is contraindicated since it can result in liver toxicity and significantly prolong the elimination of the drug by many years. Dapsone Pregnancy And Lactation Text: This medication is Pregnancy Category C and is not considered safe during pregnancy or breast feeding. Drysol Counseling:  I discussed with the patient the risks of drysol/aluminum chloride including but not limited to skin rash, itching, irritation, burning. Griseofulvin Counseling:  I discussed with the patient the risks of griseofulvin including but not limited to photosensitivity, cytopenia, liver damage, nausea/vomiting and severe allergy.  The patient understands that this medication is best absorbed when taken with a fatty meal (e.g., ice cream or french fries). Stelara Counseling:  I discussed with the patient the risks of ustekinumab including but not limited to immunosuppression, malignancy, posterior leukoencephalopathy syndrome, and serious infections.  The patient understands that monitoring is required including a PPD at baseline and must alert us or the primary physician if symptoms of infection or other concerning signs are noted. Prednisone Counseling:  I discussed with the patient the risks of prolonged use of prednisone including but not limited to weight gain, insomnia, osteoporosis, mood changes, diabetes, susceptibility to infection, glaucoma and high blood pressure.  In cases where prednisone use is prolonged, patients should be monitored with blood pressure checks, serum glucose levels and an eye exam.  Additionally, the patient may need to be placed on GI prophylaxis, PCP prophylaxis, and calcium and vitamin D supplementation and/or a bisphosphonate.  The patient verbalized understanding of the proper use and the possible adverse effects of prednisone.  All of the patient's questions and concerns were addressed. Eucrisa Counseling: Patient may experience a mild burning sensation during topical application. Eucrisa is not approved in children less than 2 years of age. Acitretin Pregnancy And Lactation Text: This medication is Pregnancy Category X and should not be given to women who are pregnant or may become pregnant in the future. This medication is excreted in breast milk. Protopic Counseling: Patient may experience a mild burning sensation during topical application. Protopic is not approved in children less than 2 years of age. There have been case reports of hematologic and skin malignancies in patients using topical calcineurin inhibitors although causality is questionable. Birth Control Pills Counseling: Birth Control Pill Counseling: I discussed with the patient the potential side effects of OCPs including but not limited to increased risk of stroke, heart attack, thrombophlebitis, deep venous thrombosis, hepatic adenomas, breast changes, GI upset, headaches, and depression.  The patient verbalized understanding of the proper use and possible adverse effects of OCPs. All of the patient's questions and concerns were addressed. Hydroquinone Counseling:  Patient advised that medication may result in skin irritation, lightening (hypopigmentation), dryness, and burning.  In the event of skin irritation, the patient was advised to reduce the amount of the drug applied or use it less frequently.  Rarely, spots that are treated with hydroquinone can become darker (pseudoochronosis).  Should this occur, patient instructed to stop medication and call the office. The patient verbalized understanding of the proper use and possible adverse effects of hydroquinone.  All of the patient's questions and concerns were addressed. Opioid Counseling: I discussed with the patient the potential side effects of opioids including but not limited to addiction, altered mental status, and depression. I stressed avoiding alcohol, benzodiazepines, muscle relaxants and sleep aids unless specifically okayed by a physician. The patient verbalized understanding of the proper use and possible adverse effects of opioids. All of the patient's questions and concerns were addressed. They were instructed to flush the remaining pills down the toilet if they did not need them for pain. Xolair Pregnancy And Lactation Text: This medication is Pregnancy Category B and is considered safe during pregnancy. This medication is excreted in breast milk. Hydroxyzine Counseling: Patient advised that the medication is sedating and not to drive a car after taking this medication.  Patient informed of potential adverse effects including but not limited to dry mouth, urinary retention, and blurry vision.  The patient verbalized understanding of the proper use and possible adverse effects of hydroxyzine.  All of the patient's questions and concerns were addressed. Tranexamic Acid Pregnancy And Lactation Text: It is unknown if this medication is safe during pregnancy or breast feeding. Glycopyrrolate Pregnancy And Lactation Text: This medication is Pregnancy Category B and is considered safe during pregnancy. It is unknown if it is excreted breast milk. Topical Clindamycin Counseling: Patient counseled that this medication may cause skin irritation or allergic reactions.  In the event of skin irritation, the patient was advised to reduce the amount of the drug applied or use it less frequently.   The patient verbalized understanding of the proper use and possible adverse effects of clindamycin.  All of the patient's questions and concerns were addressed. Tranexamic Acid Counseling:  Patient advised of the small risk of bleeding problems with tranexamic acid. They were also instructed to call if they developed any nausea, vomiting or diarrhea. All of the patient's questions and concerns were addressed. Cimzia Counseling:  I discussed with the patient the risks of Cimzia including but not limited to immunosuppression, allergic reactions and infections.  The patient understands that monitoring is required including a PPD at baseline and must alert us or the primary physician if symptoms of infection or other concerning signs are noted. Bactrim Counseling:  I discussed with the patient the risks of sulfa antibiotics including but not limited to GI upset, allergic reaction, drug rash, diarrhea, dizziness, photosensitivity, and yeast infections.  Rarely, more serious reactions can occur including but not limited to aplastic anemia, agranulocytosis, methemoglobinemia, blood dyscrasias, liver or kidney failure, lung infiltrates or desquamative/blistering drug rashes. Ketoconazole Counseling:   Patient counseled regarding improving absorption with orange juice.  Adverse effects include but are not limited to breast enlargement, headache, diarrhea, nausea, upset stomach, liver function test abnormalities, taste disturbance, and stomach pain.  There is a rare possibility of liver failure that can occur when taking ketoconazole. The patient understands that monitoring of LFTs may be required, especially at baseline. The patient verbalized understanding of the proper use and possible adverse effects of ketoconazole.  All of the patient's questions and concerns were addressed. Solaraze Pregnancy And Lactation Text: This medication is Pregnancy Category B and is considered safe. There is some data to suggest avoiding during the third trimester. It is unknown if this medication is excreted in breast milk. Cosentyx Counseling:  I discussed with the patient the risks of Cosentyx including but not limited to worsening of Crohn's disease, immunosuppression, allergic reactions and infections.  The patient understands that monitoring is required including a PPD at baseline and must alert us or the primary physician if symptoms of infection or other concerning signs are noted. Finasteride Male Counseling: Finasteride Counseling:  I discussed with the patient the risks of use of finasteride including but not limited to decreased libido, decreased ejaculate volume, gynecomastia, and depression. Women should not handle medication.  All of the patient's questions and concerns were addressed. Clindamycin Pregnancy And Lactation Text: This medication can be used in pregnancy if certain situations. Clindamycin is also present in breast milk. Finasteride Pregnancy And Lactation Text: This medication is absolutely contraindicated during pregnancy. It is unknown if it is excreted in breast milk. Odomzo Counseling- I discussed with the patient the risks of Odomzo including but not limited to nausea, vomiting, diarrhea, constipation, weight loss, changes in the sense of taste, decreased appetite, muscle spasms, and hair loss.  The patient verbalized understanding of the proper use and possible adverse effects of Odomzo.  All of the patient's questions and concerns were addressed. Hydroxychloroquine Pregnancy And Lactation Text: This medication has been shown to cause fetal harm but it isn't assigned a Pregnancy Risk Category. There are small amounts excreted in breast milk. Topical Sulfur Applications Counseling: Topical Sulfur Counseling: Patient counseled that this medication may cause skin irritation or allergic reactions.  In the event of skin irritation, the patient was advised to reduce the amount of the drug applied or use it less frequently.   The patient verbalized understanding of the proper use and possible adverse effects of topical sulfur application.  All of the patient's questions and concerns were addressed. Dupixent Pregnancy And Lactation Text: This medication likely crosses the placenta but the risk for the fetus is uncertain. This medication is excreted in breast milk. Minocycline Pregnancy And Lactation Text: This medication is Pregnancy Category D and not consider safe during pregnancy. It is also excreted in breast milk. Minocycline Counseling: Patient advised regarding possible photosensitivity and discoloration of the teeth, skin, lips, tongue and gums.  Patient instructed to avoid sunlight, if possible.  When exposed to sunlight, patients should wear protective clothing, sunglasses, and sunscreen.  The patient was instructed to call the office immediately if the following severe adverse effects occur:  hearing changes, easy bruising/bleeding, severe headache, or vision changes.  The patient verbalized understanding of the proper use and possible adverse effects of minocycline.  All of the patient's questions and concerns were addressed. Isotretinoin Counseling: Patient should get monthly blood tests, not donate blood, not drive at night if vision affected, not share medication, and not undergo elective surgery for 6 months after tx completed. Side effects reviewed, pt to contact office should one occur. Drysol Pregnancy And Lactation Text: This medication is considered safe during pregnancy and breast feeding. Fluconazole Counseling:  Patient counseled regarding adverse effects of fluconazole including but not limited to headache, diarrhea, nausea, upset stomach, liver function test abnormalities, taste disturbance, and stomach pain.  There is a rare possibility of liver failure that can occur when taking fluconazole.  The patient understands that monitoring of LFTs and kidney function test may be required, especially at baseline. The patient verbalized understanding of the proper use and possible adverse effects of fluconazole.  All of the patient's questions and concerns were addressed. Erythromycin Counseling:  I discussed with the patient the risks of erythromycin including but not limited to GI upset, allergic reaction, drug rash, diarrhea, increase in liver enzymes, and yeast infections. Tetracycline Counseling: Patient counseled regarding possible photosensitivity and increased risk for sunburn.  Patient instructed to avoid sunlight, if possible.  When exposed to sunlight, patients should wear protective clothing, sunglasses, and sunscreen.  The patient was instructed to call the office immediately if the following severe adverse effects occur:  hearing changes, easy bruising/bleeding, severe headache, or vision changes.  The patient verbalized understanding of the proper use and possible adverse effects of tetracycline.  All of the patient's questions and concerns were addressed. Patient understands to avoid pregnancy while on therapy due to potential birth defects. Rhofade Pregnancy And Lactation Text: This medication has not been assigned a Pregnancy Risk Category. It is unknown if the medication is excreted in breast milk. Carac Counseling:  I discussed with the patient the risks of Carac including but not limited to erythema, scaling, itching, weeping, crusting, and pain. Cyclosporine Counseling:  I discussed with the patient the risks of cyclosporine including but not limited to hypertension, gingival hyperplasia,myelosuppression, immunosuppression, liver damage, kidney damage, neurotoxicity, lymphoma, and serious infections. The patient understands that monitoring is required including baseline blood pressure, CBC, CMP, lipid panel and uric acid, and then 1-2 times monthly CMP and blood pressure. Elidel Counseling: Patient may experience a mild burning sensation during topical application. Elidel is not approved in children less than 2 years of age. There have been case reports of hematologic and skin malignancies in patients using topical calcineurin inhibitors although causality is questionable. Methotrexate Counseling:  Patient counseled regarding adverse effects of methotrexate including but not limited to nausea, vomiting, abnormalities in liver function tests. Patients may develop mouth sores, rash, diarrhea, and abnormalities in blood counts. The patient understands that monitoring is required including LFT's and blood counts.  There is a rare possibility of scarring of the liver and lung problems that can occur when taking methotrexate. Persistent nausea, loss of appetite, pale stools, dark urine, cough, and shortness of breath should be reported immediately. Patient advised to discontinue methotrexate treatment at least three months before attempting to become pregnant.  I discussed the need for folate supplements while taking methotrexate.  These supplements can decrease side effects during methotrexate treatment. The patient verbalized understanding of the proper use and possible adverse effects of methotrexate.  All of the patient's questions and concerns were addressed. Glycopyrrolate Counseling:  I discussed with the patient the risks of glycopyrrolate including but not limited to skin rash, drowsiness, dry mouth, difficulty urinating, and blurred vision. Ilumya Counseling: I discussed with the patient the risks of tildrakizumab including but not limited to immunosuppression, malignancy, posterior leukoencephalopathy syndrome, and serious infections.  The patient understands that monitoring is required including a PPD at baseline and must alert us or the primary physician if symptoms of infection or other concerning signs are noted. Ivermectin Counseling:  Patient instructed to take medication on an empty stomach with a full glass of water.  Patient informed of potential adverse effects including but not limited to nausea, diarrhea, dizziness, itching, and swelling of the extremities or lymph nodes.  The patient verbalized understanding of the proper use and possible adverse effects of ivermectin.  All of the patient's questions and concerns were addressed. Rituxan Counseling:  I discussed with the patient the risks of Rituxan infusions. Side effects can include infusion reactions, severe drug rashes including mucocutaneous reactions, reactivation of latent hepatitis and other infections and rarely progressive multifocal leukoencephalopathy.  All of the patient's questions and concerns were addressed. Enbrel Counseling:  I discussed with the patient the risks of etanercept including but not limited to myelosuppression, immunosuppression, autoimmune hepatitis, demyelinating diseases, lymphoma, and infections.  The patient understands that monitoring is required including a PPD at baseline and must alert us or the primary physician if symptoms of infection or other concerning signs are noted. Quinolones Counseling:  I discussed with the patient the risks of fluoroquinolones including but not limited to GI upset, allergic reaction, drug rash, diarrhea, dizziness, photosensitivity, yeast infections, liver function test abnormalities, tendonitis/tendon rupture. Valtrex Pregnancy And Lactation Text: this medication is Pregnancy Category B and is considered safe during pregnancy. This medication is not directly found in breast milk but it's metabolite acyclovir is present. Clindamycin Counseling: I counseled the patient regarding use of clindamycin as an antibiotic for prophylactic and/or therapeutic purposes. Clindamycin is active against numerous classes of bacteria, including skin bacteria. Side effects may include nausea, diarrhea, gastrointestinal upset, rash, hives, yeast infections, and in rare cases, colitis. Propranolol Counseling:  I discussed with the patient the risks of propranolol including but not limited to low heart rate, low blood pressure, low blood sugar, restlessness and increased cold sensitivity. They should call the office if they experience any of these side effects. Tremfya Counseling: I discussed with the patient the risks of guselkumab including but not limited to immunosuppression, serious infections, and drug reactions.  The patient understands that monitoring is required including a PPD at baseline and must alert us or the primary physician if symptoms of infection or other concerning signs are noted. Niacinamide Pregnancy And Lactation Text: These medications are considered safe during pregnancy. Calcipotriene Pregnancy And Lactation Text: This medication has not been proven safe during pregnancy. It is unknown if this medication is excreted in breast milk. Clofazimine Counseling:  I discussed with the patient the risks of clofazimine including but not limited to skin and eye pigmentation, liver damage, nausea/vomiting, gastrointestinal bleeding and allergy. Rituxan Pregnancy And Lactation Text: This medication is Pregnancy Category C and it isn't know if it is safe during pregnancy. It is unknown if this medication is excreted in breast milk but similar antibodies are known to be excreted. Terbinafine Counseling: Patient counseling regarding adverse effects of terbinafine including but not limited to headache, diarrhea, rash, upset stomach, liver function test abnormalities, itching, taste/smell disturbance, nausea, abdominal pain, and flatulence.  There is a rare possibility of liver failure that can occur when taking terbinafine.  The patient understands that a baseline LFT and kidney function test may be required. The patient verbalized understanding of the proper use and possible adverse effects of terbinafine.  All of the patient's questions and concerns were addressed. Ketoconazole Pregnancy And Lactation Text: This medication is Pregnancy Category C and it isn't know if it is safe during pregnancy. It is also excreted in breast milk and breast feeding isn't recommended. Dapsone Counseling: I discussed with the patient the risks of dapsone including but not limited to hemolytic anemia, agranulocytosis, rashes, methemoglobinemia, kidney failure, peripheral neuropathy, headaches, GI upset, and liver toxicity.  Patients who start dapsone require monitoring including baseline LFTs and weekly CBCs for the first month, then every month thereafter.  The patient verbalized understanding of the proper use and possible adverse effects of dapsone.  All of the patient's questions and concerns were addressed. Wartpeel Counseling:  I discussed with the patient the risks of Wartpeel including but not limited to erythema, scaling, itching, weeping, crusting, and pain. Methotrexate Pregnancy And Lactation Text: This medication is Pregnancy Category X and is known to cause fetal harm. This medication is excreted in breast milk. Cimetidine Counseling:  I discussed with the patient the risks of Cimetidine including but not limited to gynecomastia, headache, diarrhea, nausea, drowsiness, arrhythmias, pancreatitis, skin rashes, psychosis, bone marrow suppression and kidney toxicity. Nsaids Pregnancy And Lactation Text: These medications are considered safe up to 30 weeks gestation. It is excreted in breast milk. Detail Level: Generalized Spironolactone Pregnancy And Lactation Text: This medication can cause feminization of the male fetus and should be avoided during pregnancy. The active metabolite is also found in breast milk. Otezla Counseling: The side effects of Otezla were discussed with the patient, including but not limited to worsening or new depression, weight loss, diarrhea, nausea, upper respiratory tract infection, and headache. Patient instructed to call the office should any adverse effect occur.  The patient verbalized understanding of the proper use and possible adverse effects of Otezla.  All the patient's questions and concerns were addressed. Itraconazole Counseling:  I discussed with the patient the risks of itraconazole including but not limited to liver damage, nausea/vomiting, neuropathy, and severe allergy.  The patient understands that this medication is best absorbed when taken with acidic beverages such as non-diet cola or ginger ale.  The patient understands that monitoring is required including baseline LFTs and repeat LFTs at intervals.  The patient understands that they are to contact us or the primary physician if concerning signs are noted. High Dose Vitamin A Counseling: Side effects reviewed, pt to contact office should one occur. Nsaids Counseling: NSAID Counseling: I discussed with the patient that NSAIDs should be taken with food. Prolonged use of NSAIDs can result in the development of stomach ulcers.  Patient advised to stop taking NSAIDs if abdominal pain occurs.  The patient verbalized understanding of the proper use and possible adverse effects of NSAIDs.  All of the patient's questions and concerns were addressed. Bactrim Pregnancy And Lactation Text: This medication is Pregnancy Category D and is known to cause fetal risk.  It is also excreted in breast milk. Arava Counseling:  Patient counseled regarding adverse effects of Arava including but not limited to nausea, vomiting, abnormalities in liver function tests. Patients may develop mouth sores, rash, diarrhea, and abnormalities in blood counts. The patient understands that monitoring is required including LFTs and blood counts.  There is a rare possibility of scarring of the liver and lung problems that can occur when taking methotrexate. Persistent nausea, loss of appetite, pale stools, dark urine, cough, and shortness of breath should be reported immediately. Patient advised to discontinue Arava treatment and consult with a physician prior to attempting conception. The patient will have to undergo a treatment to eliminate Arava from the body prior to conception. Niacinamide Counseling: I recommended taking niacin or niacinamide, also know as vitamin B3, twice daily. Recent evidence suggests that taking vitamin B3 (500 mg twice daily) can reduce the risk of actinic keratoses and non-melanoma skin cancers. Side effects of vitamin B3 include flushing and headache. Taltz Counseling: I discussed with the patient the risks of ixekizumab including but not limited to immunosuppression, serious infections, worsening of inflammatory bowel disease and drug reactions.  The patient understands that monitoring is required including a PPD at baseline and must alert us or the primary physician if symptoms of infection or other concerning signs are noted. Erythromycin Pregnancy And Lactation Text: This medication is Pregnancy Category B and is considered safe during pregnancy. It is also excreted in breast milk. Cimzia Pregnancy And Lactation Text: This medication crosses the placenta but can be considered safe in certain situations. Cimzia may be excreted in breast milk. Griseofulvin Pregnancy And Lactation Text: This medication is Pregnancy Category X and is known to cause serious birth defects. It is unknown if this medication is excreted in breast milk but breast feeding should be avoided.

## (undated) DEVICE — BLOCK BITE 60FR CAREGUARD

## (undated) DEVICE — FIAPC® PROBE W/ FILTER 3000 A OD 1.5MM/4.5FR; L 3M/9.8FT: Brand: ERBE

## (undated) DEVICE — MASK,FACE,MAXFLUIDPROTECT,SHIELD/ERLPS: Brand: MEDLINE

## (undated) DEVICE — TUBING, SUCTION, 1/4" X 10', STRAIGHT: Brand: MEDLINE

## (undated) DEVICE — SPONGE GZ 4IN 4IN 4 PLY N WVN AVANT

## (undated) DEVICE — LUBRICANT SURG JELLY ST BACTER TUBE 4.25OZ

## (undated) DEVICE — Device: Brand: SPOT EX ENDOSCOPIC TATTOO

## (undated) DEVICE — Device: Brand: DEFENDO VALVE AND CONNECTOR KIT

## (undated) DEVICE — NEEDLE 25GAX5.0MM INJ CARR LOCKE

## (undated) DEVICE — GOWN ISOLATN REG YEL M WT MULTIPLY SIDETIE LEV 2

## (undated) DEVICE — CONTAINER SPEC COLL 960ML POLYPR TRIANG GRAD INTAKE/OUTPUT

## (undated) DEVICE — KENDALL 450 SERIES MONITORING FOAM ELECTRODE - RECTANGULAR SHAPE ( 3/PK): Brand: KENDALL

## (undated) DEVICE — ELECTRODE PT RET AD L9FT HI MOIST COND ADH HYDRGEL CORDED

## (undated) DEVICE — SYRINGE MED 50ML LUERSLIP TIP

## (undated) DEVICE — SINGLE USE SPLINTING TUBE: Brand: SINGLE USE SPLINTING TUBE

## (undated) DEVICE — KIT BEDSIDE REVITAL OX 500ML